# Patient Record
Sex: FEMALE | Race: WHITE | NOT HISPANIC OR LATINO | Employment: OTHER | URBAN - METROPOLITAN AREA
[De-identification: names, ages, dates, MRNs, and addresses within clinical notes are randomized per-mention and may not be internally consistent; named-entity substitution may affect disease eponyms.]

---

## 2017-07-25 ENCOUNTER — OFFICE VISIT - HEALTHEAST (OUTPATIENT)
Dept: FAMILY MEDICINE | Facility: CLINIC | Age: 57
End: 2017-07-25

## 2017-07-25 DIAGNOSIS — L30.9 DERMATITIS: ICD-10-CM

## 2017-07-25 ASSESSMENT — MIFFLIN-ST. JEOR: SCORE: 1095.69

## 2017-08-08 ENCOUNTER — COMMUNICATION - HEALTHEAST (OUTPATIENT)
Dept: FAMILY MEDICINE | Facility: CLINIC | Age: 57
End: 2017-08-08

## 2017-10-12 ENCOUNTER — COMMUNICATION - HEALTHEAST (OUTPATIENT)
Dept: FAMILY MEDICINE | Facility: CLINIC | Age: 57
End: 2017-10-12

## 2017-10-12 DIAGNOSIS — G25.0 ESSENTIAL TREMOR: ICD-10-CM

## 2018-04-01 ENCOUNTER — COMMUNICATION - HEALTHEAST (OUTPATIENT)
Dept: FAMILY MEDICINE | Facility: CLINIC | Age: 58
End: 2018-04-01

## 2018-04-01 DIAGNOSIS — G25.0 ESSENTIAL TREMOR: ICD-10-CM

## 2018-09-19 ENCOUNTER — COMMUNICATION - HEALTHEAST (OUTPATIENT)
Dept: FAMILY MEDICINE | Facility: CLINIC | Age: 58
End: 2018-09-19

## 2018-11-26 ENCOUNTER — COMMUNICATION - HEALTHEAST (OUTPATIENT)
Dept: FAMILY MEDICINE | Facility: CLINIC | Age: 58
End: 2018-11-26

## 2018-11-26 ENCOUNTER — OFFICE VISIT - HEALTHEAST (OUTPATIENT)
Dept: FAMILY MEDICINE | Facility: CLINIC | Age: 58
End: 2018-11-26

## 2018-11-26 DIAGNOSIS — R10.32 ABDOMINAL PAIN, LEFT LOWER QUADRANT: ICD-10-CM

## 2018-11-26 DIAGNOSIS — Z12.31 VISIT FOR SCREENING MAMMOGRAM: ICD-10-CM

## 2018-11-26 DIAGNOSIS — N94.10 DYSPAREUNIA, FEMALE: ICD-10-CM

## 2018-11-26 ASSESSMENT — MIFFLIN-ST. JEOR: SCORE: 1135.6

## 2018-12-13 ENCOUNTER — COMMUNICATION - HEALTHEAST (OUTPATIENT)
Dept: FAMILY MEDICINE | Facility: CLINIC | Age: 58
End: 2018-12-13

## 2018-12-17 ENCOUNTER — COMMUNICATION - HEALTHEAST (OUTPATIENT)
Dept: FAMILY MEDICINE | Facility: CLINIC | Age: 58
End: 2018-12-17

## 2018-12-19 ENCOUNTER — RECORDS - HEALTHEAST (OUTPATIENT)
Dept: ADMINISTRATIVE | Facility: OTHER | Age: 58
End: 2018-12-19

## 2018-12-21 ENCOUNTER — COMMUNICATION - HEALTHEAST (OUTPATIENT)
Dept: FAMILY MEDICINE | Facility: CLINIC | Age: 58
End: 2018-12-21

## 2018-12-21 DIAGNOSIS — R10.32 LLQ ABDOMINAL PAIN: ICD-10-CM

## 2018-12-26 ENCOUNTER — COMMUNICATION - HEALTHEAST (OUTPATIENT)
Dept: FAMILY MEDICINE | Facility: CLINIC | Age: 58
End: 2018-12-26

## 2018-12-27 ENCOUNTER — COMMUNICATION - HEALTHEAST (OUTPATIENT)
Dept: FAMILY MEDICINE | Facility: CLINIC | Age: 58
End: 2018-12-27

## 2019-03-14 ENCOUNTER — OFFICE VISIT - HEALTHEAST (OUTPATIENT)
Dept: FAMILY MEDICINE | Facility: CLINIC | Age: 59
End: 2019-03-14

## 2019-03-14 DIAGNOSIS — G25.0 ESSENTIAL TREMOR: ICD-10-CM

## 2019-03-14 DIAGNOSIS — F32.A DEPRESSION, UNSPECIFIED DEPRESSION TYPE: ICD-10-CM

## 2019-03-14 DIAGNOSIS — Z12.11 SCREEN FOR COLON CANCER: ICD-10-CM

## 2019-03-14 DIAGNOSIS — L30.9 ECZEMA, UNSPECIFIED TYPE: ICD-10-CM

## 2019-03-14 ASSESSMENT — MIFFLIN-ST. JEOR: SCORE: 1108.39

## 2019-06-06 ENCOUNTER — COMMUNICATION - HEALTHEAST (OUTPATIENT)
Dept: FAMILY MEDICINE | Facility: CLINIC | Age: 59
End: 2019-06-06

## 2019-06-14 ENCOUNTER — COMMUNICATION - HEALTHEAST (OUTPATIENT)
Dept: FAMILY MEDICINE | Facility: CLINIC | Age: 59
End: 2019-06-14

## 2019-06-14 DIAGNOSIS — F32.A DEPRESSION, UNSPECIFIED DEPRESSION TYPE: ICD-10-CM

## 2019-06-14 DIAGNOSIS — L30.9 DERMATITIS: ICD-10-CM

## 2019-12-03 ENCOUNTER — COMMUNICATION - HEALTHEAST (OUTPATIENT)
Dept: FAMILY MEDICINE | Facility: CLINIC | Age: 59
End: 2019-12-03

## 2019-12-03 DIAGNOSIS — R68.89 COLD INTOLERANCE: ICD-10-CM

## 2019-12-06 ENCOUNTER — COMMUNICATION - HEALTHEAST (OUTPATIENT)
Dept: SCHEDULING | Facility: CLINIC | Age: 59
End: 2019-12-06

## 2019-12-11 ENCOUNTER — RECORDS - HEALTHEAST (OUTPATIENT)
Dept: ADMINISTRATIVE | Facility: OTHER | Age: 59
End: 2019-12-11

## 2020-06-03 ENCOUNTER — COMMUNICATION - HEALTHEAST (OUTPATIENT)
Dept: FAMILY MEDICINE | Facility: CLINIC | Age: 60
End: 2020-06-03

## 2020-06-03 DIAGNOSIS — G25.0 ESSENTIAL TREMOR: ICD-10-CM

## 2020-06-18 ENCOUNTER — COMMUNICATION - HEALTHEAST (OUTPATIENT)
Dept: FAMILY MEDICINE | Facility: CLINIC | Age: 60
End: 2020-06-18

## 2020-06-23 ENCOUNTER — OFFICE VISIT - HEALTHEAST (OUTPATIENT)
Dept: FAMILY MEDICINE | Facility: CLINIC | Age: 60
End: 2020-06-23

## 2020-06-23 DIAGNOSIS — N95.2 ATROPHIC VAGINITIS: ICD-10-CM

## 2020-06-23 DIAGNOSIS — F41.1 GAD (GENERALIZED ANXIETY DISORDER): ICD-10-CM

## 2020-06-23 DIAGNOSIS — R21 RASH: ICD-10-CM

## 2020-06-23 DIAGNOSIS — G25.0 ESSENTIAL TREMOR: ICD-10-CM

## 2020-06-23 RX ORDER — PROPRANOLOL HYDROCHLORIDE 40 MG/1
40 TABLET ORAL 2 TIMES DAILY
Qty: 180 TABLET | Refills: 3 | Status: SHIPPED | OUTPATIENT
Start: 2020-06-23 | End: 2021-11-01

## 2020-06-23 RX ORDER — FLUOXETINE 10 MG/1
10 CAPSULE ORAL DAILY
Qty: 90 CAPSULE | Refills: 3 | Status: SHIPPED | OUTPATIENT
Start: 2020-06-23 | End: 2021-11-01

## 2020-06-23 RX ORDER — ESTRADIOL 0.1 MG/G
CREAM VAGINAL
Qty: 42.5 G | Refills: 1 | Status: SHIPPED
Start: 2020-06-23 | End: 2021-11-01

## 2020-06-23 RX ORDER — KETOCONAZOLE 20 MG/G
CREAM TOPICAL DAILY
Qty: 15 G | Refills: 0 | Status: SHIPPED | OUTPATIENT
Start: 2020-06-23 | End: 2024-06-24

## 2020-06-23 ASSESSMENT — PATIENT HEALTH QUESTIONNAIRE - PHQ9: SUM OF ALL RESPONSES TO PHQ QUESTIONS 1-9: 1

## 2020-06-23 ASSESSMENT — MIFFLIN-ST. JEOR: SCORE: 1089.68

## 2021-05-03 ENCOUNTER — RECORDS - HEALTHEAST (OUTPATIENT)
Dept: GENERAL RADIOLOGY | Facility: CLINIC | Age: 61
End: 2021-05-03

## 2021-05-03 ENCOUNTER — OFFICE VISIT - HEALTHEAST (OUTPATIENT)
Dept: FAMILY MEDICINE | Facility: CLINIC | Age: 61
End: 2021-05-03

## 2021-05-03 DIAGNOSIS — M25.572 PAIN IN LEFT ANKLE AND JOINTS OF LEFT FOOT: ICD-10-CM

## 2021-05-03 DIAGNOSIS — M25.572 PAIN IN JOINT, ANKLE AND FOOT, LEFT: ICD-10-CM

## 2021-05-03 DIAGNOSIS — S92.352D CLOSED DISPLACED FRACTURE OF FIFTH METATARSAL BONE OF LEFT FOOT WITH ROUTINE HEALING: ICD-10-CM

## 2021-05-03 ASSESSMENT — MIFFLIN-ST. JEOR: SCORE: 1104.65

## 2021-05-03 NOTE — ASSESSMENT & PLAN NOTE
Fifth metatarsal fracture, closed.  Given the displacement, will place and nonweightbearing along with splinting.  Will send to podiatry for further evaluation and treatment.  Continue icing and NSAIDs as needed.

## 2021-05-03 NOTE — ASSESSMENT & PLAN NOTE
Nonweightbearing, splinting, podiatry referral.  Also given relatively low pressure resulting in fracture, will get DEXA scan as I amworried for bone health overall.

## 2021-05-05 ENCOUNTER — COMMUNICATION - HEALTHEAST (OUTPATIENT)
Dept: PODIATRY | Facility: CLINIC | Age: 61
End: 2021-05-05

## 2021-05-05 ENCOUNTER — RECORDS - HEALTHEAST (OUTPATIENT)
Dept: ADMINISTRATIVE | Facility: OTHER | Age: 61
End: 2021-05-05

## 2021-05-05 ENCOUNTER — OFFICE VISIT - HEALTHEAST (OUTPATIENT)
Dept: PODIATRY | Facility: CLINIC | Age: 61
End: 2021-05-05

## 2021-05-05 ENCOUNTER — OFFICE VISIT - HEALTHEAST (OUTPATIENT)
Dept: FAMILY MEDICINE | Facility: CLINIC | Age: 61
End: 2021-05-05

## 2021-05-05 ENCOUNTER — AMBULATORY - HEALTHEAST (OUTPATIENT)
Dept: VASCULAR SURGERY | Facility: CLINIC | Age: 61
End: 2021-05-05

## 2021-05-05 ENCOUNTER — SURGERY - HEALTHEAST (OUTPATIENT)
Dept: VASCULAR SURGERY | Facility: CLINIC | Age: 61
End: 2021-05-05

## 2021-05-05 DIAGNOSIS — Z01.818 PREOPERATIVE EXAMINATION: ICD-10-CM

## 2021-05-05 DIAGNOSIS — S92.352A CLOSED DISPLACED FRACTURE OF FIFTH METATARSAL BONE OF LEFT FOOT, INITIAL ENCOUNTER: ICD-10-CM

## 2021-05-05 DIAGNOSIS — S92.352D CLOSED DISPLACED FRACTURE OF FIFTH METATARSAL BONE OF LEFT FOOT WITH ROUTINE HEALING: ICD-10-CM

## 2021-05-05 LAB
ATRIAL RATE - MUSE: 52 BPM
DIASTOLIC BLOOD PRESSURE - MUSE: NORMAL
INTERPRETATION ECG - MUSE: NORMAL
P AXIS - MUSE: 43 DEGREES
PR INTERVAL - MUSE: 158 MS
QRS DURATION - MUSE: 80 MS
QT - MUSE: 416 MS
QTC - MUSE: 386 MS
R AXIS - MUSE: -25 DEGREES
SYSTOLIC BLOOD PRESSURE - MUSE: NORMAL
T AXIS - MUSE: 41 DEGREES
VENTRICULAR RATE- MUSE: 52 BPM

## 2021-05-05 ASSESSMENT — MIFFLIN-ST. JEOR: SCORE: 1090.24

## 2021-05-06 ENCOUNTER — AMBULATORY - HEALTHEAST (OUTPATIENT)
Dept: VASCULAR SURGERY | Facility: CLINIC | Age: 61
End: 2021-05-06

## 2021-05-06 ENCOUNTER — ANESTHESIA - HEALTHEAST (OUTPATIENT)
Dept: SURGERY | Facility: HOSPITAL | Age: 61
End: 2021-05-06

## 2021-05-06 ENCOUNTER — COMMUNICATION - HEALTHEAST (OUTPATIENT)
Dept: SCHEDULING | Facility: CLINIC | Age: 61
End: 2021-05-06

## 2021-05-06 ENCOUNTER — COMMUNICATION - HEALTHEAST (OUTPATIENT)
Dept: EMERGENCY MEDICINE | Facility: CLINIC | Age: 61
End: 2021-05-06

## 2021-05-06 DIAGNOSIS — S92.352D CLOSED DISPLACED FRACTURE OF FIFTH METATARSAL BONE OF LEFT FOOT WITH ROUTINE HEALING: ICD-10-CM

## 2021-05-06 LAB
SARS-COV-2 PCR COMMENT: ABNORMAL
SARS-COV-2 RNA SPEC QL NAA+PROBE: POSITIVE
SARS-COV-2 VIRUS SPECIMEN SOURCE: ABNORMAL

## 2021-05-07 ENCOUNTER — AMBULATORY - HEALTHEAST (OUTPATIENT)
Dept: NURSING | Facility: CLINIC | Age: 61
End: 2021-05-07

## 2021-05-07 ENCOUNTER — COMMUNICATION - HEALTHEAST (OUTPATIENT)
Dept: NURSING | Facility: CLINIC | Age: 61
End: 2021-05-07

## 2021-05-07 ENCOUNTER — RECORDS - HEALTHEAST (OUTPATIENT)
Dept: ADMINISTRATIVE | Facility: OTHER | Age: 61
End: 2021-05-07

## 2021-05-07 DIAGNOSIS — U07.1 2019 NOVEL CORONAVIRUS DISEASE (COVID-19): ICD-10-CM

## 2021-05-11 ENCOUNTER — COMMUNICATION - HEALTHEAST (OUTPATIENT)
Dept: FAMILY MEDICINE | Facility: CLINIC | Age: 61
End: 2021-05-11

## 2021-05-13 ENCOUNTER — AMBULATORY - HEALTHEAST (OUTPATIENT)
Dept: VASCULAR SURGERY | Facility: CLINIC | Age: 61
End: 2021-05-13

## 2021-05-13 DIAGNOSIS — Z98.890 S/P ORIF (OPEN REDUCTION INTERNAL FIXATION) FRACTURE: ICD-10-CM

## 2021-05-13 DIAGNOSIS — S92.352A CLOSED DISPLACED FRACTURE OF FIFTH METATARSAL BONE OF LEFT FOOT, INITIAL ENCOUNTER: ICD-10-CM

## 2021-05-13 DIAGNOSIS — Z87.81 S/P ORIF (OPEN REDUCTION INTERNAL FIXATION) FRACTURE: ICD-10-CM

## 2021-05-18 ENCOUNTER — RECORDS - HEALTHEAST (OUTPATIENT)
Dept: GENERAL RADIOLOGY | Facility: CLINIC | Age: 61
End: 2021-05-18

## 2021-05-18 ENCOUNTER — OFFICE VISIT - HEALTHEAST (OUTPATIENT)
Dept: PODIATRY | Facility: CLINIC | Age: 61
End: 2021-05-18

## 2021-05-18 DIAGNOSIS — Z98.890 OTHER SPECIFIED POSTPROCEDURAL STATES: ICD-10-CM

## 2021-05-18 DIAGNOSIS — Z87.81 PERSONAL HISTORY OF (HEALED) TRAUMATIC FRACTURE: ICD-10-CM

## 2021-05-18 DIAGNOSIS — S92.352A CLOSED DISPLACED FRACTURE OF FIFTH METATARSAL BONE OF LEFT FOOT, INITIAL ENCOUNTER: ICD-10-CM

## 2021-05-18 ASSESSMENT — MIFFLIN-ST. JEOR: SCORE: 1108.39

## 2021-05-24 ENCOUNTER — RECORDS - HEALTHEAST (OUTPATIENT)
Dept: ADMINISTRATIVE | Facility: CLINIC | Age: 61
End: 2021-05-24

## 2021-05-25 ENCOUNTER — RECORDS - HEALTHEAST (OUTPATIENT)
Dept: ADMINISTRATIVE | Facility: CLINIC | Age: 61
End: 2021-05-25

## 2021-05-26 ENCOUNTER — RECORDS - HEALTHEAST (OUTPATIENT)
Dept: ADMINISTRATIVE | Facility: CLINIC | Age: 61
End: 2021-05-26

## 2021-05-27 ENCOUNTER — RECORDS - HEALTHEAST (OUTPATIENT)
Dept: ADMINISTRATIVE | Facility: CLINIC | Age: 61
End: 2021-05-27

## 2021-05-27 VITALS
OXYGEN SATURATION: 97 % | SYSTOLIC BLOOD PRESSURE: 123 MMHG | BODY MASS INDEX: 20.85 KG/M2 | TEMPERATURE: 98 F | WEIGHT: 122.4 LBS | DIASTOLIC BLOOD PRESSURE: 76 MMHG | HEART RATE: 59 BPM

## 2021-05-27 VITALS
HEART RATE: 69 BPM | WEIGHT: 118 LBS | HEIGHT: 64 IN | SYSTOLIC BLOOD PRESSURE: 110 MMHG | OXYGEN SATURATION: 96 % | DIASTOLIC BLOOD PRESSURE: 78 MMHG | BODY MASS INDEX: 20.14 KG/M2

## 2021-05-27 VITALS
SYSTOLIC BLOOD PRESSURE: 104 MMHG | HEIGHT: 64 IN | HEART RATE: 78 BPM | DIASTOLIC BLOOD PRESSURE: 54 MMHG | BODY MASS INDEX: 20.83 KG/M2 | WEIGHT: 122 LBS | RESPIRATION RATE: 15 BRPM

## 2021-05-27 VITALS
SYSTOLIC BLOOD PRESSURE: 102 MMHG | DIASTOLIC BLOOD PRESSURE: 64 MMHG | BODY MASS INDEX: 20.54 KG/M2 | TEMPERATURE: 97.9 F | WEIGHT: 120.3 LBS | HEART RATE: 60 BPM | HEIGHT: 64 IN | RESPIRATION RATE: 12 BRPM

## 2021-05-27 ASSESSMENT — PATIENT HEALTH QUESTIONNAIRE - PHQ9: SUM OF ALL RESPONSES TO PHQ QUESTIONS 1-9: 1

## 2021-05-28 ENCOUNTER — AMBULATORY - HEALTHEAST (OUTPATIENT)
Dept: VASCULAR SURGERY | Facility: CLINIC | Age: 61
End: 2021-05-28

## 2021-05-28 ENCOUNTER — OFFICE VISIT - HEALTHEAST (OUTPATIENT)
Dept: PODIATRY | Facility: CLINIC | Age: 61
End: 2021-05-28

## 2021-05-28 ENCOUNTER — RECORDS - HEALTHEAST (OUTPATIENT)
Dept: ADMINISTRATIVE | Facility: CLINIC | Age: 61
End: 2021-05-28

## 2021-05-28 DIAGNOSIS — S92.352A CLOSED DISPLACED FRACTURE OF FIFTH METATARSAL BONE OF LEFT FOOT, INITIAL ENCOUNTER: ICD-10-CM

## 2021-05-31 VITALS — HEIGHT: 64 IN | WEIGHT: 119.2 LBS | BODY MASS INDEX: 20.35 KG/M2

## 2021-06-02 VITALS — WEIGHT: 122 LBS | HEIGHT: 64 IN | BODY MASS INDEX: 20.83 KG/M2

## 2021-06-02 VITALS — BODY MASS INDEX: 21.85 KG/M2 | HEIGHT: 64 IN | WEIGHT: 128 LBS

## 2021-06-04 VITALS — BODY MASS INDEX: 19.97 KG/M2 | WEIGHT: 117 LBS | HEIGHT: 64 IN

## 2021-06-04 NOTE — TELEPHONE ENCOUNTER
Patient is calling to state that her doctor would like her to have a thyroid cascade test.  Patient would like to know what that would actually be looking for.  Did discuss with patient that the thyroid is a gland that helps to control many functions in the body such as metabolism.  Patient did state that she mentioned such symptoms to her PCP about fatigue and weight gain, feeling cold among others.  Did discuss with patient that the test will measure the hormone levels in her blood and this will help her provider give her treatment recommendations.  Patient stated understanding and had no further questions.  Farheen Quiroga RN  [facility] Triage Nurse  3:40 PM  12/6/2019

## 2021-06-07 ENCOUNTER — COMMUNICATION - HEALTHEAST (OUTPATIENT)
Dept: PODIATRY | Facility: CLINIC | Age: 61
End: 2021-06-07

## 2021-06-08 NOTE — TELEPHONE ENCOUNTER
RN cannot approve Refill Request    RN can NOT refill this medication PCP messaged that patient is overdue for Labs and Office Visit. Last office visit: 3/14/2019 Dorothy Gimenez MD Last Physical: 12/14/2016 Last MTM visit: Visit date not found Last visit same specialty: 3/14/2019 Dorothy Gimenez MD.  Next visit within 3 mo: Visit date not found  Next physical within 3 mo: Visit date not found      Ronit Weller, [facility] Triage/Med Refill 6/6/2020    Requested Prescriptions   Pending Prescriptions Disp Refills     propranoloL (INDERAL) 40 MG tablet [Pharmacy Med Name: Propranolol HCl Oral Tablet 40 MG] 180 tablet 0     Sig: TAKE ONE TABLET BY MOUTH THREE TIMES DAILY AS NEEDED       Beta-Blockers Refill Protocol Failed - 6/3/2020  9:36 AM        Failed - PCP or prescribing provider visit in past 12 months or next 3 months     Last office visit with prescriber/PCP: 3/14/2019 Dorothy Gimenez MD OR same dept: Visit date not found OR same specialty: 3/14/2019 Dorothy Gimenez MD  Last physical: 12/14/2016 Last MTM visit: Visit date not found   Next visit within 3 mo: Visit date not found  Next physical within 3 mo: Visit date not found  Prescriber OR PCP: Dorothy Gimenez MD  Last diagnosis associated with med order: There are no diagnoses linked to this encounter.  If protocol passes may refill for 12 months if within 3 months of last provider visit (or a total of 15 months).             Failed - Blood pressure filed in past 12 months     BP Readings from Last 1 Encounters:   03/14/19 116/74

## 2021-06-08 NOTE — TELEPHONE ENCOUNTER
Patient is overdue for O.V.; please schedule virtual med check. 30 day supply of Propranolol given

## 2021-06-08 NOTE — TELEPHONE ENCOUNTER
Patient has no health insurance and can't afford an video visit at this time.  [facility]

## 2021-06-09 NOTE — PROGRESS NOTES
"Karly Pemberton is a 59 y.o. female who is being evaluated via a billable telephone visit.      The patient has been notified of following:     \"This telephone visit will be conducted via a call between you and your physician/provider. We have found that certain health care needs can be provided without the need for a physical exam.  This service lets us provide the care you need with a short phone conversation.  If a prescription is necessary we can send it directly to your pharmacy.  If lab work is needed we can place an order for that and you can then stop by our lab to have the test done at a later time.    Telephone visits are billed at different rates depending on your insurance coverage. During this emergency period, for some insurers they may be billed the same as an in-person visit.  Please reach out to your insurance provider with any questions.    If during the course of the call the physician/provider feels a telephone visit is not appropriate, you will not be charged for this service.\"    Patient has given verbal consent to a Telephone visit? Yes    What phone number would you like to be contacted at? [phone]    Patient would like to receive their AVS by AVS Preference: Kylie.    Additional provider notes: Karly is a 59-year-old female presenting today via a virtual telephone visit for a medication check in.  The patient has a history of generalized anxiety disorder for which she is on fluoxetine 30 mg daily.  She has been stable on this dose and doing well for quite some time and is interested in continuing.  She also has a history of atrophic vaginitis for which she takes estradiol.  Lastly, she has an essential tremor and she is on propranolol for that.  She is doing well with all of her medications and in general does not have any specific complaints or concerns today.  Patient does mention she currently is out of health insurance.    Assessment/Plan:  1. JULIETA (generalized anxiety " disorder)  Continue current dosage of medication; doing well  - FLUoxetine (PROZAC) 20 MG capsule; Take 1 capsule (20 mg total) by mouth daily.  Dispense: 90 capsule; Refill: 3  - FLUoxetine (PROZAC) 10 MG capsule; Take 1 capsule (10 mg total) by mouth daily.  Dispense: 90 capsule; Refill: 3    2. Essential tremor  Continue; stable and responding well to Propranolol  - propranoloL (INDERAL) 40 MG tablet; Take 1 tablet (40 mg total) by mouth 2 (two) times a day.  Dispense: 180 tablet; Refill: 3    3. Rash  - ketoconazole (NIZORAL) 2 % cream; Apply topically daily.  Dispense: 15 g; Refill: 0    4. Atrophic vaginitis  - estradioL (ESTRACE) 0.01 % (0.1 mg/gram) vaginal cream; Insert .5-1 GM twice weekly intravaginally  Dispense: 42.5 g; Refill: 1        Phone call duration:  7 minutes    Dorothy Gimenez MD

## 2021-06-09 NOTE — TELEPHONE ENCOUNTER
Called and scheduled patient for a telephone visit med check next Tuesday with . She declined video due to cost.       Angella Young CMA 6/19/2020 2:49 PM   Kang SARAVIA

## 2021-06-12 NOTE — PROGRESS NOTES
Assessment/Plan:     1. Dermatitis  Refilled triamcinolone.  Discussed allergy testing and avoidance.  Patient states she knows what typically causes her flareup so she declines at this time.  Call return to care follow-up with primary care provider if symptoms worsen or do not improve.  - triamcinolone (KENALOG) 0.1 % ointment; Apply topically 3 (three) times a day.  Dispense: 15 g; Refill: 1          Subjective:      Karly Pemberton is a 56 y.o. female comes in today complaining of a rash on the lips.  States it has been there for about a week and a half she has been using Vaseline on it which does seem to calm it down but is wanting to try something stronger.  Has a prescription of steroid creams that she previously tried for it but the tube is MD requesting refill.  States that she has had these off and on over the last couple years.  She thinks it is due to food allergies.  She otherwise does not have any concerns does not feeling swelling inside her mouth any shortness of breath or difficulty swallowing.  She typically goes to another [facility] office my first time meeting with her so briefly reviewed the rest of her family history.    Current Outpatient Prescriptions   Medication Sig Dispense Refill     betamethasone dipropionate (DIPROLENE) 0.05 % cream Apply to affected skin twice daily 45 g 0     FLUoxetine (PROZAC) 20 MG capsule Take 1 capsule (20 mg total) by mouth daily. 90 capsule 3     hydrocortisone 1 % cream Apply 1 application topically 2 (two) times a day.       propranolol (INDERAL) 10 MG tablet TAKE ONE TABLET BY MOUTH THREE TIMES DAILY AS NEEDED 90 tablet 2     triamcinolone (KENALOG) 0.1 % ointment Apply topically 3 (three) times a day. 15 g 1     No current facility-administered medications for this visit.      Allergies   Allergen Reactions     Compazine [Prochlorperazine Edisylate] Other (See Comments)     Per patient frozen face      Past Medical History, Family History, and Social  "History reviewed.  Past Medical History:   Diagnosis Date     Fibrocystic breast      Past Surgical History:   Procedure Laterality Date     MI LIGATE FALLOPIAN TUBE      Description: Tubal Ligation;  Recorded: 09/04/2014;     Compazine [prochlorperazine edisylate]  Family History   Problem Relation Age of Onset     Breast cancer Mother 65     Social History     Social History     Marital status:      Spouse name: N/A     Number of children: N/A     Years of education: N/A     Occupational History     Not on file.     Social History Main Topics     Smoking status: Never Smoker     Smokeless tobacco: Never Used     Alcohol use No     Drug use: No     Sexual activity: Yes     Partners: Male     Other Topics Concern     Not on file     Social History Narrative         Review of systems is as stated in HPI, and the remainder of the 10 system review is otherwise negative.    Objective:     Vitals:    07/25/17 1453   BP: 110/60   Patient Site: Right Arm   Patient Position: Sitting   Cuff Size: Adult Regular   Pulse: 72   Weight: 119 lb 3.2 oz (54.1 kg)   Height: 5' 4\" (1.626 m)    Body mass index is 20.46 kg/(m^2).  Wt Readings from Last 3 Encounters:   07/25/17 119 lb 3.2 oz (54.1 kg)   12/14/16 114 lb 8 oz (51.9 kg)   09/21/15 115 lb 8 oz (52.4 kg)       General Appearance:    Alert, cooperative, no distress, appears stated age    Head:    Normocephalic, without obvious abnormality, atraumatic   Eyes:    PERRL, , no conjunctivitis    Ears:    Normal  external ear canals   Nose:   Mucosa normal, no drainage        Throat:   Oropharynx is clear   Neck:   Supple, symmetrical, no adenopathy, no thyromegally, no carotid bruit        Lungs:     Clear to auscultation bilaterally, respirations unlabored        Heart:    Regular rate and rhythm, S1 and S2 normal, no murmur, rub    or gallop                   Extremities:   Extremities normal, atraumatic, no cyanosis or edema       Neuro:   cranial nerves grossly intact  "       Skin:  Mild erythema with micropapules.  No vesicles open lesions discharge or signs of infection around lips, otherwise no rashes or lesions   :          This note has been dictated using voice recognition software. Any grammatical or context distortions are unintentional and inherent to the software.

## 2021-06-16 PROBLEM — S92.352A CLOSED DISPLACED FRACTURE OF FIFTH METATARSAL BONE OF LEFT FOOT, INITIAL ENCOUNTER: Status: ACTIVE | Noted: 2021-05-05

## 2021-06-16 PROBLEM — N95.2 ATROPHIC VAGINITIS: Status: ACTIVE | Noted: 2020-06-23

## 2021-06-16 PROBLEM — F41.1 GAD (GENERALIZED ANXIETY DISORDER): Status: ACTIVE | Noted: 2019-03-14

## 2021-06-16 PROBLEM — S92.352D CLOSED DISPLACED FRACTURE OF FIFTH METATARSAL BONE OF LEFT FOOT WITH ROUTINE HEALING: Status: ACTIVE | Noted: 2021-05-03

## 2021-06-16 PROBLEM — M25.572 PAIN IN JOINT, ANKLE AND FOOT, LEFT: Status: ACTIVE | Noted: 2021-05-03

## 2021-06-17 NOTE — PATIENT INSTRUCTIONS - HE
Stop in [address] 20-25 minutes prior to your next appointment to get repeat x-rays.    Remain non-weightbearing on your left foot with CAM boot on.    Gauze applied today, please leave in place until follow up appointment in 2 weeks.    It is not ok to get your wound wet     Call the clinic for any questions regarding your wound or cares and if you experience signs or symptoms of infection including: fevers, chills, increased redness, increased drainage, foul odor, increased pain at [phone].

## 2021-06-17 NOTE — ANESTHESIA PROCEDURE NOTES
Peripheral Block    Patient location during procedure: pre-op  Start time: 5/6/2021 4:38 PM  End time: 5/6/2021 4:41 PM  post-op analgesia per surgeon order as noted in medical record  Staffing:  Performing  Anesthesiologist: Traci Mai MD  Preanesthetic Checklist  Completed: patient identified, site marked, risks, benefits, and alternatives discussed, timeout performed, consent obtained, airway assessed, oxygen available, suction available, emergency drugs available and hand hygiene performed  Peripheral Block  Block type: sciatic, popliteal  Prep: ChloraPrep  Patient position: supine  Patient monitoring: blood pressure, heart rate, cardiac monitor and continuous pulse oximetry  Laterality: left  Injection technique: ultrasound guided    Ultrasound used to visualize needle placement in proximity to nerve being blocked: yes   US used to visualize anesthetic spread  Visualized anatomic structures normal  No Pathological Findings  Permanent ultrasound image captured for medical record  Sterile gel and probe cover used for ultrasound.  Needle  Needle type: Stimuplex   Needle gauge: 20G  Needle length: 4 in  no peripheral nerve catheter placed  Assessment  Injection assessment: no difficulty with injection

## 2021-06-17 NOTE — TELEPHONE ENCOUNTER
Reason for Call: Jenni is calling to get order for DEXA scan changed to CT Bone density. Verbal order would be fine.    Detailed comments: please call Jenni with verbal order for CT Bone Density. She is unable to schedule without the correct order.    Phone Number Patient can be reached at: Other phone number:  Jenni [phone]    Best Time: any    Can we leave a detailed message on this number?: Yes    Call taken on 5/11/2021 at 12:53 PM by Dayana Gillespie

## 2021-06-17 NOTE — TELEPHONE ENCOUNTER
She tested positive for COVID 04/19/21 with [facility]. She will email the results. She was swabbed again today but it will most likely come back positive. Please confirm if past results will satisfy surgery center so she can proceed with surgery tomorrow. I will forward the email when it arrives.   WAPD at bedside.

## 2021-06-17 NOTE — TELEPHONE ENCOUNTER
I would prefer if she should get her Dexa scan done at [facility]; is that something she would consider?

## 2021-06-17 NOTE — PATIENT INSTRUCTIONS - HE
Surgery Information    **ALL [facility] PAPERWORK IS TO BE FAXED -[phone], IT WILL BE PROCESSED AFTER SURGERY IS COMPLETE.  You MUST have a Preoperative physical within 30 days before surgery!!!      Surgery Date 5/5    Surgery Time: 4:15 at [facility]- Arrival time 2:15    The Preop Nurse will call you 1-2 days before with exact arrival time  ( Arrive at the hospital two hours prior to your surgery and 1 hour prior at the surgery center. Check in at the . The only exception is if you are scheduled for surgery at 7am, you should arrive at 5:30am)    IF YOU NEED TO RESCHEDULE OR CANCEL YOUR SURGERY FOR ANY REASON PLEASE CALL THE CLINIC AS SOON AS POSSIBLE -[phone].    Admission Type: Outpatient    Surgeon: Dr. Cortes    Surgery Procedure: ORIF  fifth metatarsal bone of left foot    Surgery Location: [facility],[address] [place] [address], Main Admitting      COVID TEST: Today    POST OPERATIVE APPT: 5/18 at 10 am    If you take Blood Thinners Such as:  Warfarin, Xarelto, Plavix, Aspirin or Eliquis this will need to be HELD prior to procedure according to primary care provider/doctor or cardiology who prescribes this medication. Generally this is 5 days.    Additional Information: If you use a CPAP machine for sleep apnea please bring it with you for surgery. We will want to monitor your breathing using your normal equipment.   If you need to reach the surgery scheduler please call the main number at [phone] and ask for Joana for Dr. Cortes    [facility] INFORMATION    We need to know a lot of information about you before surgery.     1-5 Days Before:     A nurse will call you for a pre-screening interview. The phone call with the nurse will be much faster and easier if you  Have organized your information prior to the call.     Please have the following information available:    Preoperative Exam completed and faxed to our office    Primary care provider's and any specialty providers'  contact information available. .    If requested by your primary care provider, have any heart and lung exams at least 3 days before surgery.    Have a complete and accurate list of medications available.     Have a list of your allergies/sensitivities and reactions    Know your health history, surgical history, and medical problems    Know any anesthesia issues with you or within your family.     BE SURE TO NOTIFY US IF YOU ARE TAKING ANY BLOOD THINNING AGENTS: Coumadin, Plavix, Aspirin, Xarelto, Eliquis    Someone planned to bring you and stay with you after the surgery    Day Before Surgery    1. STOP Smoking and Drinking: It is important to stop smoking and drinking at least 24 hours before surgery. Smoking and drinking may cause complications in your recovery from anesthesia and may lengthen the healing process.    2. Pack for your hospital stay: If it will be required for you to stay at the hospital after surgery, bring personal items such as a robe, slippers, pajamas, additional clothing and toiletries. Don't forget:    List of medication    Eyeglass case or contact case with solution. You cannot wear contacts during surgery    Copies of your physical exam , lab results and EKG    Copy of Health Care Directives, Living Will or Power of     Insurance Cards    Photo ID    CPAP machine    3. NOTHING BY MOUTH 8 HOURS BEFORE. This includes gum, hard candy, water and mints. The only exception is if you have been instructed by your doctor to take your medications with sips of water. You may rinse your mouth or brush your teeth, but do not swallow water.     4. Remove Nail Polish  Day of Surgery    1. Medications- Take as indicated with sips of water     2. Wear comfortable loose fitting clothes. Wear your glasses-Not contacts. Do not wear jewelry and remove body piercing's. Surgery may be cancelled if they are not removed.     3. If same day surgery-Have a someone come with you to surgery that can help you  understand the surgeon's instructions, drive you home and stay with you overnight the first night.     4. A nurse will call you at home within 72 hours following surgery to see how you are doing. Our nurses and doctors will discuss recovery with you.      The surgery scheduler Joana for Dr. Cortes will contact you to schedule if you were not scheduled today or you need to make any adjustments to your surgery.     You will need a preop physical within 30 days before surgery with your primary care provider. Please note if you do not get a complete History and Physical your surgery will be cancelled.   Please contact your primary to schedule.     A nurse should contact you from the hospital 1-2 days before surgery to review with you.    If you would like a Good Lori Estimate for your upcoming service/procedure contact Cost of Care Estimates at [phone], advocates are available Monday through Friday 8am - 5pm. You may also submit a request online at [email or web address]    [facility]  [address]  [address]     [phone] for facility charge    Anesthesiology charge  [phone]          Please don't hesitate to call us with any additional question or problems  Our number is [phone]     Instructions for Patients Scheduled for Vascular or Podiatry Procedures During the COVID 19 Pandemic    Your Provider has determined that your condition warrants going ahead with your procedure at this time.  You will need to be tested for COVID19 within 72 hours of your procedure. We highly encourage patients to get tested for COVID-19 at one of our designated [facility] testing sites. We process the tests in our lab, which allows us to get the results quickly. If you choose to get tested at a non-[facility] location, you will need to contact your primary care provider to make those arrangements and ensure the results  are available to your surgeon before you are arriving for your procedure. If we do not receive the results in time, your procedure may be postponed or canceled.  Please make sure your test is collected 3-days prior to your procedure date.  The results will need to get faxed to [phone].  After testing, you will need to remain in self-quarantine until your procedure.  If you are notified of a positive COVID-19 result; you will need to call your provider for further recommendations.    Please call [phone] and press option 2 to speak to a nurse.  If the test is positive; DO NOT PRESENT FOR YOUR PROCEDURE until you have been given further instructions.  You will not be called with negative results so arrive as instructed unless otherwise notified.  Don't:    Don't invite visitors or friends into your home.    Don't leave your home unless absolutely necessary.    Don't share utensils and other household items with others in the home.  Do:    Wash your hands regularly with soap and water and use hand  with at least 60% alcohol if you don't have easy access to soap and water.     Disinfect surface areas daily, including doorknobs, electronics - especially phones, laptops and other devices.     Wash utensils and other items thoroughly.     Separate yourself from others in the household as best you can, including pets.    Keep your hands away from your face.     Practice all other prevention tips the CDC recommends, including covering your coughs and sneezes with a tissue or your sleeve and immediately throwing the tissue into the trash and washing your hands.    Call your hospital if you develop the following signs before your surgery:    Fever.    Cough.    Shortness of breath.    Sore throat.    Runny or stuffy nose.    Muscle or body aches.    Headaches.    Fatigue.    Vomiting and diarrhea.    These steps will help keep you & your family, other patients, and hospital staff safe from COVID19.

## 2021-06-17 NOTE — ANESTHESIA PROCEDURE NOTES
Peripheral Block    Patient location during procedure: pre-op  Start time: 5/6/2021 4:36 PM  End time: 5/6/2021 4:38 PM  post-op analgesia per surgeon order as noted in medical record  Staffing:  Performing  Anesthesiologist: Traci Mai MD  Preanesthetic Checklist  Completed: patient identified, site marked, risks, benefits, and alternatives discussed, timeout performed, consent obtained, airway assessed, oxygen available, suction available, emergency drugs available and hand hygiene performed  Peripheral Block  Block type: saphenous, adductor canal block  Prep: ChloraPrep  Patient position: supine  Patient monitoring: blood pressure, heart rate, continuous pulse oximetry and cardiac monitor  Laterality: left  Injection technique: ultrasound guided    Ultrasound used to visualize needle placement in proximity to nerve being blocked: yes   US used to visualize anesthetic spread  Visualized anatomic structures normal  No Pathological Findings  Permanent ultrasound image captured for medical record  Sterile gel and probe cover used for ultrasound.  Needle  Needle type: Stimuplex   Needle gauge: 20G  Needle length: 4 in  no peripheral nerve catheter placed  Assessment  Injection assessment: no difficulty with injection

## 2021-06-17 NOTE — ANESTHESIA CARE TRANSFER NOTE
Last vitals:   Vitals:    05/06/21 1925   BP: 132/70   Pulse: 92   Resp: 14   Temp: 36.1  C (97  F)   SpO2: 98%     Patient's level of consciousness is awake  Spontaneous respirations: yes  Maintains airway independently: yes  Dentition unchanged: yes  Oropharynx: oropharynx clear of all foreign objects    QCDR Measures:  ASA# 20 - Surgical Safety Checklist: WHO surgical safety checklist completed prior to induction    PQRS# 430 - Adult PONV Prevention: NA - Not adult patient, not GA or 3 or more risk factors NOT present  ASA# 8 - Peds PONV Prevention: NA - Not pediatric patient, not GA or 2 or more risk factors NOT present  PQRS# 424 - Nancy-op Temp Management: 4559F - At least one body temp DOCUMENTED => 35.5C or 95.9F within required timeframe  PQRS# 426 - PACU Transfer Protocol: - Transfer of care checklist used  ASA# 14 - Acute Post-op Pain: ASA14B - Patient did NOT experience pain >= 7 out of 10

## 2021-06-17 NOTE — PROGRESS NOTES
[facility]  [address] [address]  Dept: [phone]  Dept Fax: [phone]  Primary Provider: Dorothy Gimenez MD  Pre-op Performing Provider: BANG NELSON      PREOPERATIVE EVALUATION:  Today's date: 5/5/2021    Karly Pemberton is a 60 y.o. female who presents for a preoperative evaluation.    Surgical Information:  Surgery/Procedure: Left Foot  Surgery Location: [place]  Surgeon: Se. Cardenas  Surgery Date: 5/6/2021  Time of Surgery: 4:15pm  Where patient plans to recover: At home with family  Fax number for surgical facility: Note does not need to be faxed, will be available electronically in Epic.    Type of Anesthesia Anticipated: Local with MAC    Assessment & Plan      The proposed surgical procedure is considered LOW risk.    Preoperative examination  No contraindications for planned procedure. She needs to have a COVID test prior to her procedure so we will help her facilitate that today. She apparently had COVID three weeks ago and so there is a real chance that her test result could come back positive. I told her to produce some evidence of her positive test from [facility] and that it would be her surgeon's discretion on how to proceed if her COVID test does indeed come back positive  - Electrocardiogram Perform and Read    Closed displaced fracture of fifth metatarsal bone of left foot with routine healing  She fell awkwardly while doing some yardwork and fractured her fifth metatarsal. She is scheduled for surgery tomorrow.          Risks and Recommendations:  The patient has the following additional risks and recommendations for perioperative complications:   - No identified additional risk factors other than previously addressed    Medication Instructions:  Patient is to take all scheduled medications on the day of surgery    RECOMMENDATION:  APPROVAL GIVEN to proceed with proposed procedure, without further diagnostic evaluation.    Review of external  notes as documented above       Subjective     HPI related to upcoming procedure: She fell awkwardly while doing some yardwork and fractured her fifth metatarsal. She is scheduled for surgery tomorrow.     Preop Questions 5/5/2021   Have you ever had a heart attack or stroke? No   Have you ever had surgery on your heart or blood vessels, such as a stent placement, a coronary artery bypass, or surgery on an artery in your head, neck, heart, or legs? No   Do you have chest pain with activity? No   Do you have a history of  heart failure? No   Do you currently have a cold, bronchitis or symptoms of other infection? No   Do you have a cough, shortness of breath, or wheezing? No   Do you or anyone in your family have previous history of blood clots? No   Do you or does anyone in your family have a serious bleeding problem such as prolonged bleeding following surgeries or cuts? No   Have you ever had problems with anemia or been told to take iron pills? No   Have you had any abnormal blood loss such as black, tarry or bloody stools, or abnormal vaginal bleeding? No   Have you ever had a blood transfusion? No   Are you willing to have a blood transfusion if it is medically needed before, during, or after your surgery? Yes   Have you or any of your relatives ever had problems with anesthesia? YES - Her mother has nausea   Do you have sleep apnea, excessive snoring or daytime drowsiness? No   Do you have any artifical heart valves or other implanted medical devices like a pacemaker, defibrillator, or continuous glucose monitor? No   Do you have artificial joints? No   Are you allergic to latex? No   Is there any chance that you may be pregnant? No     Health Care Directive:  Patient does not have a Health Care Directive or Living Will: Discussed advance care planning with patient; however, patient declined at this time.    Preoperative Review of :    reviewed - no record of controlled substances prescribed.    See  problem list for active medical problems.  Problems all longstanding and stable, except as noted/documented.  See ROS for pertinent symptoms related to these conditions.      Review of Systems  CONSTITUTIONAL: NEGATIVE for fever, chills, change in weight  ENT/MOUTH: Negative for ear, mouth, and throat problems  RESP: NEGATIVE for significant cough or SOB  CV: NEGATIVE for chest pain, palpitations or peripheral edema    Patient Active Problem List    Diagnosis Date Noted     Closed displaced fracture of fifth metatarsal bone of left foot, initial encounter 05/05/2021     Pain in joint, ankle and foot, left 05/03/2021     Closed displaced fracture of fifth metatarsal bone of left foot with routine healing 05/03/2021     Atrophic vaginitis 06/23/2020     JULIETA (generalized anxiety disorder) 03/14/2019     Essential tremor      Past Medical History:   Diagnosis Date     Fibrocystic breast      Past Surgical History:   Procedure Laterality Date     ID LIGATE FALLOPIAN TUBE      Description: Tubal Ligation;  Recorded: 09/04/2014;     Current Outpatient Medications   Medication Sig Dispense Refill     estradioL (ESTRACE) 0.01 % (0.1 mg/gram) vaginal cream Insert .5-1 GM twice weekly intravaginally 42.5 g 1     FLUoxetine (PROZAC) 10 MG capsule Take 1 capsule (10 mg total) by mouth daily. 90 capsule 3     FLUoxetine (PROZAC) 20 MG capsule Take 1 capsule (20 mg total) by mouth daily. 90 capsule 3     propranoloL (INDERAL) 40 MG tablet Take 1 tablet (40 mg total) by mouth 2 (two) times a day. 180 tablet 3     ketoconazole (NIZORAL) 2 % cream Apply topically daily. 15 g 0     No current facility-administered medications for this visit.        Allergies   Allergen Reactions     Compazine [Prochlorperazine Edisylate] Other (See Comments)     Per patient frozen face        Social History     Tobacco Use     Smoking status: Never Smoker     Smokeless tobacco: Never Used   Substance Use Topics     Alcohol use: No      Family History    Problem Relation Age of Onset     Breast cancer Mother 65     Social History     Substance and Sexual Activity   Drug Use No        Objective     /76   Pulse (!) 59   Temp 98  F (36.7  C) (Oral)   Wt 122 lb 6.4 oz (55.5 kg)   LMP  (LMP Unknown)   SpO2 97%   BMI 21.01 kg/m    Physical Exam  GENERAL APPEARANCE: healthy, alert and no distress  HENT: ear canals and TM's normal and nose and mouth without ulcers or lesions  RESP: lungs clear to auscultation - no rales, rhonchi or wheezes  CV: regular rate and rhythm, normal S1 S2, no S3 or S4 and no murmur, click or rub  ABDOMEN: soft, nontender, no HSM or masses and bowel sounds normal  NEURO: Normal strength and tone, sensory exam grossly normal, mentation intact and speech normal    No results for input(s): HGB, PLT, INR, NA, K, CREATININE, HBA1C in the last 73519 hours.     PRE-OP Diagnostics:   No labs were ordered during this visit.  EKG: sinus bradycardia    REVISED CARDIAC RISK INDEX (RCRI)   The patient has the following serious cardiovascular risks for perioperative complications:   - No serious cardiac risks = 0 points    RCRI INTERPRETATION: 0 points: Class I (very low risk - 0.4% complication rate)         Signed Electronically by: David Murray CNP    Copy of this evaluation report is provided to requesting physician.

## 2021-06-17 NOTE — ANESTHESIA POSTPROCEDURE EVALUATION
Patient: Karly Pemberton  Procedure(s):  OPEN REDUCTION INTERNAL FIXATION, fifth metatarsal left foot (Left)  Anesthesia type: MAC    Patient location: Phase II Recovery  Last vitals:   Vitals Value Taken Time   /68 05/06/21 1930   Temp 36.1  C (97  F) 05/06/21 1925   Pulse 91 05/06/21 1938   Resp 14 05/06/21 1930   SpO2 96 % 05/06/21 1938   Vitals shown include unvalidated device data.  Post vital signs: stable  Level of consciousness: awake and responds to simple questions  Post-anesthesia pain: pain controlled  Post-anesthesia nausea and vomiting: no  Pulmonary: unassisted  Cardiovascular: stable  Hydration: adequate  Anesthetic events: no    QCDR Measures:  ASA# 11 - Nancy-op Cardiac Arrest: ASA11B - Patient did NOT experience unanticipated cardiac arrest  ASA# 12 - Nancy-op Mortality Rate: ASA12B - Patient did NOT die  ASA# 13 - PACU Re-Intubation Rate: NA - No ETT / LMA used for case  ASA# 10 - Composite Anes Safety: ASA10A - No serious adverse event    Additional Notes:

## 2021-06-17 NOTE — PROGRESS NOTES
Assessment & Plan   Problem List Items Addressed This Visit     Pain in joint, ankle and foot, left     Fifth metatarsal fracture, closed.  Given the displacement, will place and nonweightbearing along with splinting.  Will send to podiatry for further evaluation and treatment.  Continue icing and NSAIDs as needed.         Relevant Orders    XR Foot Left 3 or More VWS    XR Ankle Left 3 or More VWS    Closed displaced fracture of fifth metatarsal bone of left foot with routine healing - Primary     Nonweightbearing, splinting, podiatry referral.  Also given relatively low pressure resulting in fracture, will get DEXA scan as I am worried for bone health overall.         Relevant Orders    Crutches Standard    Ankle/Foot DME: Walking Boot; Left; Pneumatic    Ambulatory referral to Podiatry - [facility] (includes [facility] groups)    DXA Bone Density Scan         X-ray taken in clinic initially reviewed by me.  Ankle x-ray negative for acute fracture, foot x-ray does show spiral fracture comminuted with displacement of the fifth metatarsal shaft    Return in about 4 months (around 9/5/2021) for Annual physical.  Subjective   Karly Pemberton is 60 y.o. and presents to clinic today for the following health issues   Left foot and ankle pain for the past 2 days.  Started while applying mulch.  She was up on a 1 foot elevation and stumbled backwards felt her ankle twisted she went down and landed on her right hip.  She felt something snap or crackle when she twisted her ankle.  Since then she has had pain with walking.  Initially could not walk on her foot for about 10 minutes.  Then was able to hobble.  Finds if she puts weight on the heel she has no pain.  Points to the main portion of pain being in the fifth metatarsal midshaft.  She is noticed bruising and some swelling around the ankle and the lateral portion of the foot.  No fevers or chills.  Does not recall stepping off the foot left but more twisting  "off of it.  No complaints of right hip pain.  Did not hit her head.    Foot Pain  Pertinent negatives include no chills, coughing, fever, headaches, nausea, numbness, rash or vomiting. The symptoms are aggravated by walking. She has tried acetaminophen, NSAIDs and ice for the symptoms. The treatment provided mild relief.      Review of Systems   Constitutional: Negative for chills and fever.   Respiratory: Negative for cough.    Gastrointestinal: Negative for nausea and vomiting.   Skin: Negative for rash.   Neurological: Negative for numbness and headaches.         Objective    /64 (Patient Site: Left Arm, Patient Position: Sitting, Cuff Size: Adult Regular)   Pulse 60   Temp 97.9  F (36.6  C) (Oral)   Resp 12   Ht 5' 4.25\" (1.632 m)   Wt 120 lb 4.8 oz (54.6 kg)   LMP  (LMP Unknown)   Breastfeeding No   BMI 20.49 kg/m    Body mass index is 20.49 kg/m .  Physical Exam   Constitutional: She is oriented to person, place, and time. She appears well-developed and well-nourished.   HENT:   Head: Normocephalic and atraumatic.   Eyes: Conjunctivae and EOM are normal.   Pulmonary/Chest: Effort normal.   Musculoskeletal:      Comments: Left ankle with edema noticed over the lateral malleoli and lateral portion of foot.  Tender palpation midshaft of fifth metatarsal.  No tenderness palpation of the posterior malleoli bilateral.  Tenderness around cuboid.  Neurovascularly intact distally with capillary refill less than 2 seconds.   Neurological: She is alert and oriented to person, place, and time.   Reflex Scores:       Patellar reflexes are 2+ on the right side and 2+ on the left side.  Psychiatric: She has a normal mood and affect. Her behavior is normal.   Nursing note and vitals reviewed.    "

## 2021-06-17 NOTE — PROGRESS NOTES
FOOT AND ANKLE SURGERY/PODIATRY CONSULT NOTE         ASSESSMENT:   Displaced spiral oblique fracture fifth metatarsal left foot      TREATMENT:  I have recommended ORIF of the fracture fifth metatarsal left foot.  I informed the patient that my partner Dr. Raffaele Cortes will be performing the procedure because I will be unavailable and I recommended having this done as soon as possible.  The patient was agreeable to having Dr. Cortes performed the procedure.  She was told the procedure will be done on an outpatient basis under local anesthesia with IV sedation.  She was told the procedure would take approximately 30 minutes to perform.  She would then be discharged in a cam boot for 6 weeks.  The patient was asked to go n.p.o. at midnight prior to the procedure.  She was asked to see her primary care physician for preoperative consultation.  All pertinent questions were answered.  The patient felt well-informed and was comfortable moving forward with the surgical plan.        HPI: I was asked to see Karly Pemberton today to evaluate and treat a fracture of the fifth metatarsal left foot.  The patient stated that proximally 3 days ago she fell and rolled her foot.  She had a subsequent x-ray taken which showed that she suffered a spiral oblique fracture of the fifth metatarsal.  She had some swelling and some discoloration.  She is currently ambulating with crutches and a Cam boot.  The patient was seen in consultation at the request of Honey James DO for evaluation and treatment of fracture fifth metatarsal left foot.     Past Medical History:   Diagnosis Date     Fibrocystic breast        Past Surgical History:   Procedure Laterality Date     NM LIGATE FALLOPIAN TUBE      Description: Tubal Ligation;  Recorded: 09/04/2014;       Allergies   Allergen Reactions     Compazine [Prochlorperazine Edisylate] Other (See Comments)     Per patient frozen face          Current Outpatient Medications:      estradioL  (ESTRACE) 0.01 % (0.1 mg/gram) vaginal cream, Insert .5-1 GM twice weekly intravaginally, Disp: 42.5 g, Rfl: 1     FLUoxetine (PROZAC) 10 MG capsule, Take 1 capsule (10 mg total) by mouth daily., Disp: 90 capsule, Rfl: 3     FLUoxetine (PROZAC) 20 MG capsule, Take 1 capsule (20 mg total) by mouth daily., Disp: 90 capsule, Rfl: 3     ketoconazole (NIZORAL) 2 % cream, Apply topically daily., Disp: 15 g, Rfl: 0     propranoloL (INDERAL) 40 MG tablet, Take 1 tablet (40 mg total) by mouth 2 (two) times a day., Disp: 180 tablet, Rfl: 3    Family History   Problem Relation Age of Onset     Breast cancer Mother 65       Social History     Socioeconomic History     Marital status:      Spouse name: Not on file     Number of children: Not on file     Years of education: Not on file     Highest education level: Not on file   Occupational History     Not on file   Social Needs     Financial resource strain: Not on file     Food insecurity     Worry: Not on file     Inability: Not on file     Transportation needs     Medical: Not on file     Non-medical: Not on file   Tobacco Use     Smoking status: Never Smoker     Smokeless tobacco: Never Used   Substance and Sexual Activity     Alcohol use: No     Drug use: No     Sexual activity: Yes     Partners: Male   Lifestyle     Physical activity     Days per week: Not on file     Minutes per session: Not on file     Stress: Not on file   Relationships     Social connections     Talks on phone: Not on file     Gets together: Not on file     Attends Restorationist service: Not on file     Active member of club or organization: Not on file     Attends meetings of clubs or organizations: Not on file     Relationship status: Not on file     Intimate partner violence     Fear of current or ex partner: Not on file     Emotionally abused: Not on file     Physically abused: Not on file     Forced sexual activity: Not on file   Other Topics Concern     Not on file   Social History Narrative      Not on file       Review of Systems - Patient denies fever, chills, rash, wound, stiffness, limping, numbness, weakness, heart burn, blood in stool, chest pain with activity, calf pain when walking, shortness of breath with activity, chronic cough, easy bleeding/bruising, swelling of ankles, excessive thirst, fatigue, depression, anxiety.  Patient admits to fracture fifth metatarsal left foot.      OBJECTIVE:  Appearance: alert, well appearing, and in no distress.    Vitals:    05/05/21 0959   BP: 110/78   Pulse: 69   SpO2: 96%       BMI= Body mass index is 20.25 kg/m .    General appearance: Patient is alert and fully cooperative with history & exam.  No sign of distress is noted during the visit.  Psychiatric: Affect is pleasant & appropriate.  Patient appears motivated to improve health.  Respiratory: Breathing is regular & unlabored while sitting.  HEENT: Hearing is intact to spoken word.  Speech is clear.  No gross evidence of visual impairment that would impact ambulation.    Vascular: Dorsalis pedis and posterior tibial pulses are palpable. There is pedal hair growth bilaterally.  CFT < 3 sec from anterior tibial surface to distal digits bilaterally. There is no appreciable edema noted.  Dermatologic: Turgor and texture are within normal limits. No coloration or temperature changes. No primary or secondary lesions noted.  Neurologic: All epicritic and proprioceptive sensations are grossly intact bilaterally.  Musculoskeletal: All active and passive ankle, subtalar, midtarsal, and 1st MPJ range of motion are grossly intact without pain or crepitus.. Manual muscle strength is within normal limits. All dorsiflexors, plantarflexors, invertors, evertors are intact bilaterally. Tenderness present to left foot on palpation. Tenderness to left foot with range of motion. Calf is soft/non-tender without warmth/induration    Imaging:         Xr Ankle Left 3 Or More Vws    Result Date: 5/3/2021  EXAM: XR ANKLE LEFT 3  OR MORE VWS LOCATION: [facility] DATE/TIME: 5/3/2021 1:41 PM INDICATION: Left ankle pain COMPARISON: None.     Normal joint spaces and alignment. No fracture. Mild soft tissue swelling about the ankle, in particular the lateral malleolus consistent with soft tissue injury/sprain.    Xr Foot Left 3 Or More Vws    Result Date: 5/3/2021  EXAM: XR FOOT LEFT 3 OR MORE VWS LOCATION: [facility] DATE/TIME: 5/3/2021 1:41 PM INDICATION: Left foot pain COMPARISON: None.     Acute oblique fracture of the distal diaphysis of the fifth metatarsal. No other fractures identified. Normal alignment. Normal bone mineralization.         David Lang; FRANCOIS  [facility] Foot & Ankle Surgery/Podiatry

## 2021-06-17 NOTE — PROGRESS NOTES
Podiatry Progress Note        ASSESSMENT: S/P ORIF 5th metatarsal left foot       TREATMENT:  -Surgical site on the left foot is progressing well. I reviewed x-rays which indicate reduced fracture with intact plate and screw fixation.     -Gauze dressing applied today which she will keep intact. Continue non-weight bearing on the she foot.     -She will follow-up with me in 2 weeks for suture removal.     Raffaele Cortes DPM  [facility] Podiatry/Foot & Ankle Surgery      HPI: Karly Pemberton was seen again today s/p ORIF left 5th metatarsal. She is doing well and has remained non-weight bearing.    Past Medical History:   Diagnosis Date     Fibrocystic breast      Hereditary essential tremor     left upper extremity       Allergies   Allergen Reactions     Compazine [Prochlorperazine Edisylate] Other (See Comments)     Per patient frozen face          Current Outpatient Medications:      estradioL (ESTRACE) 0.01 % (0.1 mg/gram) vaginal cream, Insert .5-1 GM twice weekly intravaginally, Disp: 42.5 g, Rfl: 1     FLUoxetine (PROZAC) 10 MG capsule, Take 1 capsule (10 mg total) by mouth daily., Disp: 90 capsule, Rfl: 3     FLUoxetine (PROZAC) 20 MG capsule, Take 1 capsule (20 mg total) by mouth daily., Disp: 90 capsule, Rfl: 3     ketoconazole (NIZORAL) 2 % cream, Apply topically daily., Disp: 15 g, Rfl: 0     oxyCODONE (ROXICODONE) 5 MG immediate release tablet, Take 1 tablet (5 mg total) by mouth every 6 (six) hours as needed for pain., Disp: 30 tablet, Rfl: 0     propranoloL (INDERAL) 40 MG tablet, Take 1 tablet (40 mg total) by mouth 2 (two) times a day., Disp: 180 tablet, Rfl: 3    Review of Systems - negative       OBJECTIVE:  Appearance: NAD    Vitals:    05/18/21 1008   BP: 104/54   Pulse: 78   Resp: 15       Surgical site on the left foot has intact sutures with skin edges well approximated, no gapping noted. No erythema left foot. Neurovascular status unchanged left foot.

## 2021-06-17 NOTE — TELEPHONE ENCOUNTER
"-Coronavirus (COVID-19) Notification    Caller Name (Patient, parent, daughter/son, grandparent, etc)  Patient -patient denied needing the following message as this was discussed 3 weeks ago.  Patient will call the procedure people for follow up plan.     Reason for call  Notify of Positive Coronavirus (COVID-19) lab results, assess symptoms,  review [facility] recommendations    Lab Result    Lab test:  2019-nCoV rRt-PCR or SARS-CoV-2 PCR    Oropharyngeal AND/OR nasopharyngeal swabs is POSITIVE for 2019-nCoV RNA/SARS-COV-2 PCR (COVID-19 virus)    RN Recommendations/Instructions per [facility] Coronavirus COVID-19 recommendations    Brief introduction script  Introduce self and then review script:  \"I am calling on behalf of [employer].  We were notified that your Coronavirus test (COVID-19) for was POSITIVE for the virus.  I have some information to relay to you but first I wanted to mention that the [place] Dept of Health will be contacting you shortly [it's possible MD already called Patient] to talk to you more about how you are feeling and other people you have had contact with who might now also have the virus.  Also, [facility] is Partnering with the [facility] for Covid-19 research, you may be contacted directly by research staff.\"    Assessment (Inquire about Patient's current symptoms)   Assessment   Current Symptoms at time of phone call: (if no symptoms, document No symptoms] None patient had positive test 3 weeks ago,   Symptom onset (if applicable) 5/5/2021     If at time of call, Patients symptoms hare worsened, the Patient should contact 911 or have someone drive them to Emergency Dept promptly:      If Patient calling 911, inform 911 personal that you have tested positive for the Coronavirus (COVID-19).  Place mask on and await 911 to arrive.    If Emergency Dept, If possible, please have another adult drive you to the Emergency Dept but you need to wear mask when in " "contact with other people.      Monoclonal Antibody Administration    You may be eligible to receive a new treatment with a monoclonal antibody for preventing hospitalization in patients at high risk for complications from COVID-19.   This medication is still experimental and available on a limited basis; it is given through an IV and must be given at an infusion center. Please note that not all people who are eligible will receive the medication since it is in limited supply.     Are you interested in being considered for this medication?  No.  Does the patient fit the criteria: No    If patient qualifies based on above criteria:  \"You will be contacted if you are selected to receive this treatment in the next 1-2 business days.   This is time sensitive and if you are not selected in the next 1-2 business days, you will not receive the medication.  If you do not receive a call to schedule, you have not been selected.\"    Review information with Patient    Your result was positive. This means you have COVID-19 (coronavirus).  We have sent you a letter that reviews the information that I'll be reviewing with you now.    How can I protect others?    If you have symptoms: stay home and away from others (self-isolate) until:    You've had no fever--and no medicine that reduces fever--for 1 full day (24 hours). And      Your other symptoms have gotten better. For example, your cough or breathing has improved. And     At least 10 days have passed since your symptoms started. (If you ve been told by a doctor that you have a weak immune system, wait 20 days.)     If you don't have symptoms: Stay home and away from others (self-isolate) until at least 10 days have passed since your first positive COVID-19 test. (Date test collected).    During this time:    Stay in your own room, including for meals. Use your own bathroom if you can.    Stay away from others in your home. No hugging, kissing or shaking hands. No visitors. "     Don't go to work, school or anywhere else.     Clean  high touch  surfaces often (doorknobs, counters, handles, etc.). Use a household cleaning spray or wipes. You'll find a full list on the EPA website at www.epa.gov/pesticide-registration/list-n-disinfectants-use-against-sars-cov-2.     Cover your mouth and nose with a mask, tissue or other face covering to avoid spreading germs.    Wash your hands and face often with soap and water.    Make a list of people you have been in close contact with recently, even if either of you wore a face covering.   ; Start your list from 2 days before you became ill or had a positive test.  ; Include anyone that was within 6 feet of you for a cumulative total of 15 minutes or more in 24 hours. (Example: if you sat next to Pravin for 5 minutes in the morning and 10 minutes in the afternoon, then you were in close contact for 15 minutes total that day. Pravin would be added to your list.)    A public health worker will call or text you. It is important that you answer. They will ask you questions about possible exposures to COVID-19, such as people you have been in direct contact with and places you have visited.    Tell the people on your list that you have COVID-19; they should stay away from others for 14 days starting from the last time they were in contact with you (unless you are told something different from a public health worker).     Caregivers in these groups are at risk for severe illness due to COVID-19:  o People 65 years and older  o People who live in a nursing home or long-term care facility  o People with chronic disease (lung, heart, cancer, diabetes, kidney, liver, immunologic)  o People who have a weakened immune system, including those who:  - Are in cancer treatment  - Take medicine that weakens the immune system, such as corticosteroids  - Had a bone marrow or organ transplant  - Have an immune deficiency  - Have poorly controlled HIV or AIDS  - Are obese  (body mass index of 40 or higher)  - Smoke regularly    Caregivers should wear gloves while washing dishes, handling laundry and cleaning bedrooms and bathrooms.    Wash and dry laundry with special caution. Don't shake dirty laundry, and use the warmest water setting you can.    If you have a weakened immune system, ask your doctor about other actions you should take.    For more tips, go to www.cdc.gov/coronavirus/2019-ncov/downloads/10Things.pdf.    You should not go back to work until you meet the guidelines above for ending your home isolation. You don't need to be retested for COVID-19 before going back to work--studies show that you won't spread the virus if it's been at least 10 days since your symptoms started (or 20 days, if you have a weak immune system).    Employers: This document serves as formal notice of your employee's medical guidelines for going back to work. They must meet the above guidelines before going back to work in person.    How can I take care of myself?    1. Get lots of rest. Drink extra fluids (unless a doctor has told you not to).    2. Take Tylenol (acetaminophen) for fever or pain. If you have liver or kidney problems, ask your family doctor if it's okay to take Tylenol.     Take either:     650 mg (two 325 mg pills) every 4 to 6 hours, or     1,000 mg (two 500 mg pills) every 8 hours as needed.     Note: Don't take more than 3,000 mg in one day. Acetaminophen is found in many medicines (both prescribed and over-the-counter medicines). Read all labels to be sure you don't take too much.    For children, check the Tylenol bottle for the right dose (based on their age or weight).    3. If you have other health problems (like cancer, heart failure, an organ transplant or severe kidney disease): Call your specialty clinic if you don't feel better in the next 2 days.    4. Know when to call 911: Emergency warning signs include:    Trouble breathing or shortness of breath    Pain or  pressure in the chest that doesn't go away    Feeling confused like you haven't felt before, or not being able to wake up    Bluish-colored lips or face    5. Sign up for [employer]. We know it's scary to hear that you have COVID-19. We want to track your symptoms to make sure you're okay over the next 2 weeks. Please look for an email from [employer]--this is a free, online program that we'll use to keep in touch. To sign up, follow the link in the email. Learn more at [email or web address].    Where can I get more information?    [facility]: [email or web address]    Coronavirus Basics: www.health.[facility].[place]/diseases/coronavirus/basics.html    What to Do If You're Sick: www.cdc.gov/coronavirus/2019-ncov/about/steps-when-sick.html    Ending Home Isolation: www.cdc.gov/coronavirus/2019-ncov/hcp/disposition-in-home-patients.html     Caring for Someone with COVID-19: www.cdc.gov/coronavirus/2019-ncov/if-you-are-sick/care-for-someone.html     [facility] clinical trials (COVID-19 research studies): [email or web address]/[email or web address]-[email or web address]     A Positive COVID-19 letter will be sent via [employer] or the Mail.  (Exception, no letters sent to Presurgerical/Preprocedure Patients)    Sindy Kong LPN

## 2021-06-17 NOTE — PROGRESS NOTES
Surgery Scheduled      Surgery/Procedure: OPEN REDUCTION INTERNAL FIXATION, fifth metatarsal left foot    Special Equipment: c-arm    Location: [facility]    Date: 5/5    Time: 4:15    Surgeon: Dr. Cortes    OR Confirmed/ :  Yes with Brit on 5/5    Orders In:  Yes    Entered on [employer] / [email or web address] Calendar:  Yes    Post Op: 5/18    Covid Scheduled: today stat    Blood Thinners Addressed: N/A    Ultrasound Notified for Intraop and order place? n/a    AVS/ Instructions given or sent? yes

## 2021-06-17 NOTE — ANESTHESIA PREPROCEDURE EVALUATION
Anesthesia Evaluation      Patient summary reviewed   No history of anesthetic complications     Airway   Mallampati: II  Neck ROM: full   Pulmonary - negative ROS and normal exam                          Cardiovascular - negative ROS and normal exam   Neuro/Psych    (+) anxiety/panic attacks,     Endo/Other - negative ROS      GI/Hepatic/Renal - negative ROS           Dental - normal exam                        Anesthesia Plan  Planned anesthetic: general mask and peripheral nerve block    ASA 2     Anesthetic plan and risks discussed with: patient    Post-op plan: routine recovery

## 2021-06-18 ENCOUNTER — OFFICE VISIT - HEALTHEAST (OUTPATIENT)
Dept: PODIATRY | Facility: CLINIC | Age: 61
End: 2021-06-18

## 2021-06-18 DIAGNOSIS — S92.352A CLOSED DISPLACED FRACTURE OF FIFTH METATARSAL BONE OF LEFT FOOT, INITIAL ENCOUNTER: ICD-10-CM

## 2021-06-21 NOTE — LETTER
Letter by Renee Lindsey CHW at      Author: Renee Lindsey CHW Service: -- Author Type: --    Filed:  Encounter Date: 5/7/2021 Status: (Other)       CARE COORDINATION  [address]  [facility] [address]    May 7, 2021    Karly Pemberton  [address] [place] [address]      Dear Karly,    I am a clinic community health worker who works with Dorothy Gimenez MD at [facility]. I wanted to thank you for spending the time to talk with me.  Below is a description of clinic care coordination and how I can further assist you.      The clinic care coordination team is made up of a registered nurse, , financial resource guide and community health worker who understand the health care system. The goal of clinic care coordination is to help you manage your health and improve access to the health care system in the most efficient manner. The team can assist you in meeting your health care goals by providing education, coordinating services, strengthening the communication among your providers and supporting you with any resource needs.    Please feel free to contact me at [phone] with any questions or concerns. We are focused on providing you with the highest-quality healthcare experience possible and that all starts with you.     Sincerely,     STEPHANIE Avery  Clinic Care Coordination   [phone]  [email or web address]

## 2021-06-21 NOTE — PROGRESS NOTES
Assessment:     1. Abdominal pain, left lower quadrant  CT Abdomen Pelvis With Oral With IV Contrast    CT Abdomen Pelvis With Oral With IV Contrast   2. Visit for screening mammogram  Mammo Screening Bilateral   3. Dyspareunia, female         Plan:     Ania is a 58-year-old female presenting today with 2 concerns.  One is regarding an intermittent pain in her left side of her abdomen.  This is in a very localized area and can reach an intensity of a 6 or 7 on a scale of 1-10.  She does not have any associated symptoms and it does not seem to be made worse either associated with bowel movements or movement.  The pain comes on at least once a week and has for the past 9 months and seems to be coming out a bit more frequently.  The patient also has a complaint of painful intercourse.  She describes that this is due to dryness and is come on only since reaching menopause.  We discussed the treatment of atrophic vaginitis and treatment options and ultimately a prescription for estrogen cream was provided.  I had the patient make a follow-up appointment for an annual exam in the near future as she is overdue for this.  The patient was also referred for screening mammogram.    Subjective:       58 y.o. female presents for evaluation of menpausal symptoms. She had her LMP about 9 months ago and is struggling with vaginal dryness and painful intercourse.  It has been to the extent that she does not want to have intercourse any longer and is here today to talk about options.  The patient is not having any issues with hot flashes or night sweats.  She does have a family history of a mom with breast cancer diagnosed at 65 and keeps up with breast exams and mammograms.      Reviewed: The following portions of the patient's history were reviewed and updated as appropriate: allergies, current medications, past family history, past medical history, past social history, past surgical history and problem list.    Review of  "Systems  Pertinent items are noted in HPI.        Objective:     /62 (Patient Site: Left Arm, Patient Position: Sitting, Cuff Size: Adult Regular)   Pulse 60   Ht 5' 4\" (1.626 m)   Wt 128 lb (58.1 kg)   BMI 21.97 kg/m    General appearance: alert, appears stated age and cooperative      This note has been dictated using voice recognition software. Any grammatical or context distortions are unintentional and inherent to the software  "

## 2021-06-23 ENCOUNTER — COMMUNICATION - HEALTHEAST (OUTPATIENT)
Dept: FAMILY MEDICINE | Facility: CLINIC | Age: 61
End: 2021-06-23

## 2021-06-23 DIAGNOSIS — N95.2 ATROPHIC VAGINITIS: ICD-10-CM

## 2021-06-23 RX ORDER — ESTRADIOL 0.1 MG/G
CREAM VAGINAL
Qty: 42.5 G | Refills: 3 | Status: SHIPPED | OUTPATIENT
Start: 2021-06-23 | End: 2023-05-18

## 2021-06-24 NOTE — PROGRESS NOTES
"Assessment:     1. Depression, unspecified depression type     2. Essential tremor     3. Screen for colon cancer  Cologuard   4. Eczema, unspecified type         Plan:     Karly is a 58-year-old female presenting today for a routine medication check visit and refills.  We discussed preventive cares today and the patient was given some resources as it relates to getting a screening mammogram as well as a discussion regarding Cologuard.  She will look into those options and follow-up in 1 year.    Subjective:       58 y.o. female presents for a routine medication check visit.  The patient has a history of some mild depression for which she is on fluoxetine 20 mg daily and doing well.  She also has a history of essential tremor for which she takes propranolol 30 mg up to 3 times daily as needed.  She also has a history of eczema and uses topical steroid creams on an as-needed basis for that.  She does not currently have any health insurance and declines colonoscopy for that reason.      Reviewed: The following portions of the patient's history were reviewed and updated as appropriate: allergies, current medications, past family history, past medical history, past social history, past surgical history and problem list.    Review of Systems  Pertinent items are noted in HPI.        Objective:     /74 (Patient Site: Left Arm, Patient Position: Sitting, Cuff Size: Adult Regular)   Pulse 70   Ht 5' 4\" (1.626 m)   Wt 122 lb (55.3 kg)   BMI 20.94 kg/m    General appearance: alert, appears stated age and cooperative      This note has been dictated using voice recognition software. Any grammatical or context distortions are unintentional and inherent to the software  "

## 2021-06-25 ENCOUNTER — COMMUNICATION - HEALTHEAST (OUTPATIENT)
Dept: FAMILY MEDICINE | Facility: CLINIC | Age: 61
End: 2021-06-25

## 2021-06-25 DIAGNOSIS — N95.2 ATROPHIC VAGINITIS: ICD-10-CM

## 2021-06-25 NOTE — TELEPHONE ENCOUNTER
Caller: Patient    Provider: MD Raffaele Cortes    Detailed reason for call: 05/06/21 ORIF fifth metatarsal. She continues to have minor stinging pain at the suture line. Her foot is still swollen and skin is purplish. She states no sudden increase in pain and no redness it has just been the same since surgery. She admits to being quite active. She just wanted to confirm that this expected and if she should be seeing some improvement soon.    Best phone number to contact: [phone]    Best time to contact: Any time    Ok to leave a detailed message: Yes

## 2021-06-25 NOTE — PROGRESS NOTES
Podiatry Progress Note        ASSESSMENT: S/P ORIF 5th metatarsal left foot       TREATMENT:  -Surgical site on the left foot is progressing well. I reviewed x-rays which indicate bony healing on lateral view intact plate and screw fixation.     -Sutures removed, steri-strips applied. Continue non-weight bearing on the left foot.     -She will follow-up with me in 3 weeks for repeat x-rays.    Raffaele Cortes DPM  [facility]/Foot & Ankle Surgery      HPI: Karly Pemberton was seen again today s/p ORIF left 5th metatarsal. She is doing well and has remained non-weight bearing.    Past Medical History:   Diagnosis Date     Fibrocystic breast      Hereditary essential tremor     left upper extremity       Allergies   Allergen Reactions     Compazine [Prochlorperazine Edisylate] Other (See Comments)     Per patient frozen face          Current Outpatient Medications:      estradioL (ESTRACE) 0.01 % (0.1 mg/gram) vaginal cream, Insert .5-1 GM twice weekly intravaginally, Disp: 42.5 g, Rfl: 1     FLUoxetine (PROZAC) 10 MG capsule, Take 1 capsule (10 mg total) by mouth daily., Disp: 90 capsule, Rfl: 3     FLUoxetine (PROZAC) 20 MG capsule, Take 1 capsule (20 mg total) by mouth daily., Disp: 90 capsule, Rfl: 3     ketoconazole (NIZORAL) 2 % cream, Apply topically daily., Disp: 15 g, Rfl: 0     oxyCODONE (ROXICODONE) 5 MG immediate release tablet, Take 1 tablet (5 mg total) by mouth every 6 (six) hours as needed for pain., Disp: 30 tablet, Rfl: 0     propranoloL (INDERAL) 40 MG tablet, Take 1 tablet (40 mg total) by mouth 2 (two) times a day., Disp: 180 tablet, Rfl: 3    Review of Systems - negative       OBJECTIVE:  Appearance: NAD    Vitals:    05/28/21 1511   BP: 126/76   Pulse: (!) 104   Temp: 98.5  F (36.9  C)       Surgical site on the left foot has intact sutures with skin edges well coapted, no gapping noted. No erythema left foot. Neurovascular status unchanged left foot.

## 2021-06-25 NOTE — TELEPHONE ENCOUNTER
Patient informed these are typical post op symptoms. She stated she is not overly concerned and will discuss at her already scheduled post op on 06/18/21.

## 2021-06-26 ENCOUNTER — HEALTH MAINTENANCE LETTER (OUTPATIENT)
Age: 61
End: 2021-06-26

## 2021-06-26 NOTE — PATIENT INSTRUCTIONS - HE
Please continue to remain non-weight bearing and utilize the CAM boot, and crutches or rolling knee walker.

## 2021-06-26 NOTE — PROGRESS NOTES
Podiatry Progress Note        ASSESSMENT: S/P ORIF 5th metatarsal left foot       TREATMENT:  -Surgical site on the left foot is progressing well. I reviewed x-rays which indicate bony healing on lateral view intact plate and screw fixation.     -I recommend continue non-weight bearing on the left foot.     -She will follow-up with me in 3 weeks for repeat x-rays.    Raffaele Cortes DPM  [facility] Podiatry/Foot & Ankle Surgery      HPI: Karly Pemberton was seen again today s/p ORIF left 5th metatarsal. She is doing well and has remained non-weight bearing.    Past Medical History:   Diagnosis Date     Fibrocystic breast      Hereditary essential tremor     left upper extremity       Allergies   Allergen Reactions     Compazine [Prochlorperazine Edisylate] Other (See Comments)     Per patient frozen face          Current Outpatient Medications:      estradioL (ESTRACE) 0.01 % (0.1 mg/gram) vaginal cream, Insert .5-1 GM twice weekly intravaginally, Disp: 42.5 g, Rfl: 1     FLUoxetine (PROZAC) 10 MG capsule, Take 1 capsule (10 mg total) by mouth daily., Disp: 90 capsule, Rfl: 3     FLUoxetine (PROZAC) 20 MG capsule, Take 1 capsule (20 mg total) by mouth daily., Disp: 90 capsule, Rfl: 3     propranoloL (INDERAL) 40 MG tablet, Take 1 tablet (40 mg total) by mouth 2 (two) times a day., Disp: 180 tablet, Rfl: 3    Review of Systems - negative       OBJECTIVE:  Appearance: NAD    Vitals:    06/18/21 1444   BP: 106/62   Pulse: 60   Resp: 16   Temp: 98.5  F (36.9  C)       Surgical site on the left foot is well coapted, no gapping noted. No erythema left foot. Neurovascular status unchanged left foot.

## 2021-06-26 NOTE — PATIENT INSTRUCTIONS - HE
Get x-rays in [address] prior to next appointment.    Remain non-weightbearing on left foot with CAM boot on.    Use crutches or rolling knee walker.

## 2021-07-03 NOTE — ADDENDUM NOTE
Addendum Note by Dorothy Gimenez MD at 6/21/2019 11:33 AM     Author: Dorothy Gimenez MD Service: -- Author Type: Physician    Filed: 6/21/2019 11:33 AM Encounter Date: 6/14/2019 Status: Signed    : Dorothy Gimenez MD (Physician)    Addended by: DOROTHY GIMENEZ on: 6/21/2019 11:33 AM        Modules accepted: Orders

## 2021-07-03 NOTE — ADDENDUM NOTE
Addendum Note by Mauricio De Jesus CMA at 11/30/2018  3:08 PM     Author: Mauricio De Jesus CMA Service: -- Author Type: Medical Assistant    Filed: 11/30/2018  3:08 PM Encounter Date: 11/26/2018 Status: Signed    : Mauricio De Jesus CMA (Medical Assistant)    Addended by: MAURICIO DE JESUS on: 11/30/2018 03:08 PM        Modules accepted: Orders

## 2021-07-03 NOTE — ADDENDUM NOTE
Addendum Note by Dorothy Gimenez MD at 11/28/2018  3:00 PM     Author: Dorothy Gimenez MD Service: -- Author Type: Physician    Filed: 11/28/2018  3:00 PM Encounter Date: 11/26/2018 Status: Signed    : Dorothy Gimenez MD (Physician)    Addended by: DOROTHY GIMENEZ on: 11/28/2018 03:00 PM        Modules accepted: Orders

## 2021-07-03 NOTE — ADDENDUM NOTE
Addendum Note by Dorothy Gimenez MD at 11/28/2018  1:21 PM     Author: Dorothy Gimenez MD Service: -- Author Type: Physician    Filed: 11/28/2018  1:21 PM Encounter Date: 11/26/2018 Status: Signed    : Dorothy Gimenez MD (Physician)    Addended by: DOROTHY GIMENEZ on: 11/28/2018 01:21 PM        Modules accepted: Orders

## 2021-07-06 VITALS
SYSTOLIC BLOOD PRESSURE: 126 MMHG | BODY MASS INDEX: 19.91 KG/M2 | TEMPERATURE: 98.5 F | HEART RATE: 104 BPM | DIASTOLIC BLOOD PRESSURE: 76 MMHG | WEIGHT: 116 LBS

## 2021-07-06 VITALS
SYSTOLIC BLOOD PRESSURE: 106 MMHG | TEMPERATURE: 98.5 F | RESPIRATION RATE: 16 BRPM | HEART RATE: 60 BPM | DIASTOLIC BLOOD PRESSURE: 62 MMHG

## 2021-09-24 DIAGNOSIS — M79.672 LEFT FOOT PAIN: Primary | ICD-10-CM

## 2021-09-27 ENCOUNTER — ANCILLARY PROCEDURE (OUTPATIENT)
Dept: RADIOLOGY | Facility: CLINIC | Age: 61
End: 2021-09-27
Attending: PODIATRIST

## 2021-09-27 ENCOUNTER — OFFICE VISIT (OUTPATIENT)
Dept: PODIATRY | Facility: CLINIC | Age: 61
End: 2021-09-27

## 2021-09-27 VITALS — BODY MASS INDEX: 19.97 KG/M2 | HEIGHT: 64 IN | HEART RATE: 61 BPM | WEIGHT: 117 LBS | OXYGEN SATURATION: 98 %

## 2021-09-27 DIAGNOSIS — S92.515A CLOSED NONDISPLACED FRACTURE OF PROXIMAL PHALANX OF LESSER TOE OF LEFT FOOT, INITIAL ENCOUNTER: Primary | ICD-10-CM

## 2021-09-27 PROCEDURE — 99213 OFFICE O/P EST LOW 20 MIN: CPT | Performed by: PODIATRIST

## 2021-09-27 ASSESSMENT — MIFFLIN-ST. JEOR: SCORE: 1080.71

## 2021-09-27 ASSESSMENT — PAIN SCALES - GENERAL: PAINLEVEL: MILD PAIN (3)

## 2021-09-27 NOTE — PROGRESS NOTES
FOOT AND ANKLE SURGERY/PODIATRY PROGRESS NOTE        ASSESSMENT: Non-displaced fracture 5th digit left foot      TREATMENT:  -There is pain along the 5th digit on the left foot. I reviewed the patient's x-rays which indicate a non-displaced fracture of the head and base of the proximal phalanx 5th digit. Intact plate and screw fixation along 5th metatarsal with bony consolidation noted.     -I reviewed the above with the patient and recommend she remain limited to non-weight bearing on the left foot with knee walker and CAM boot/surgical shoe. No surgical intervention required at this time.    -Patient's questions invited and answered. Patient to return to clinic in 3 week(s) for re-evaluation. She was encouraged to call my office with any further questions or concerns.     Raffaele Cortes DPM  [facility] Podiatry/Foot & Ankle Surgery  [phone]      HPI: Karly Pemberton was seen again today for new pain along the 5th digit left foot which started 3 weeks ago after bumping her toe. She has noticed improving pain but wanted to get the toe looked at.     Past Medical History:   Diagnosis Date     Fibrocystic breast      Hereditary essential tremor     left upper extremity       Past Surgical History:   Procedure Laterality Date     C LIGATE FALLOPIAN TUBE      Description: Tubal Ligation;  Recorded: 09/04/2014;     OPEN REDUCTION INTERNAL FIXATION FOOT Left 5/6/2021    Procedure: OPEN REDUCTION INTERNAL FIXATION, fifth metatarsal left foot;  Surgeon: Raffaele Cortes DPM;  Location: [facility];  Service: Podiatry     TUBAL LIGATION         Allergies   Allergen Reactions     Compazine [Prochlorperazine] Other (See Comments)     Per patient frozen face          Current Outpatient Medications:      FLUoxetine (PROZAC) 10 MG capsule, [FLUOXETINE (PROZAC) 10 MG CAPSULE] Take 1 capsule (10 mg total) by mouth daily., Disp: 90 capsule, Rfl: 3     FLUoxetine (PROZAC) 20 MG capsule, [FLUOXETINE (PROZAC) 20  MG CAPSULE] Take 1 capsule (20 mg total) by mouth daily., Disp: 90 capsule, Rfl: 3     ketoconazole (NIZORAL) 2 % cream, [KETOCONAZOLE (NIZORAL) 2 % CREAM] Apply topically daily., Disp: 15 g, Rfl: 0     propranoloL (INDERAL) 40 MG tablet, [PROPRANOLOL (INDERAL) 40 MG TABLET] Take 1 tablet (40 mg total) by mouth 2 (two) times a day., Disp: 180 tablet, Rfl: 3     conjugated estrogens (PREMARIN) vaginal cream, [CONJUGATED ESTROGENS (PREMARIN) VAGINAL CREAM] Apply .5-1gm intravaginally 1-2 times weekly, Disp: 42.5 g, Rfl: 1     estradioL (ESTRACE) 0.01 % (0.1 mg/gram) vaginal cream, [ESTRADIOL (ESTRACE) 0.01 % (0.1 MG/GRAM) VAGINAL CREAM] Insert .5-1 GM twice weekly intravaginally, Disp: 42.5 g, Rfl: 3     estradioL (ESTRACE) 0.01 % (0.1 mg/gram) vaginal cream, [ESTRADIOL (ESTRACE) 0.01 % (0.1 MG/GRAM) VAGINAL CREAM] Insert .5-1 GM twice weekly intravaginally, Disp: 42.5 g, Rfl: 1     oxyCODONE (ROXICODONE) 5 MG immediate release tablet, [OXYCODONE (ROXICODONE) 5 MG IMMEDIATE RELEASE TABLET] Take 1 tablet (5 mg total) by mouth every 6 (six) hours as needed for pain., Disp: 30 tablet, Rfl: 0    Family History   Problem Relation Age of Onset     Breast Cancer Mother 65.00       Social History     Socioeconomic History     Marital status:      Spouse name: Not on file     Number of children: Not on file     Years of education: Not on file     Highest education level: Not on file   Occupational History     Not on file   Tobacco Use     Smoking status: Former Smoker     Smokeless tobacco: Never Used     Tobacco comment: Quit 42 yrs ago   Substance and Sexual Activity     Alcohol use: No     Drug use: No     Sexual activity: Yes     Partners: Male   Other Topics Concern     Not on file   Social History Narrative     Not on file     Social Determinants of Health     Financial Resource Strain:      Difficulty of Paying Living Expenses:    Food Insecurity:      Worried About Running Out of Food in the Last Year:      Ran  "Out of Food in the Last Year:    Transportation Needs:      Lack of Transportation (Medical):      Lack of Transportation (Non-Medical):    Physical Activity:      Days of Exercise per Week:      Minutes of Exercise per Session:    Stress:      Feeling of Stress :    Social Connections:      Frequency of Communication with Friends and Family:      Frequency of Social Gatherings with Friends and Family:      Attends Jainism Services:      Active Member of Clubs or Organizations:      Attends Club or Organization Meetings:      Marital Status:    Intimate Partner Violence:      Fear of Current or Ex-Partner:      Emotionally Abused:      Physically Abused:      Sexually Abused:        10 point Review of Systems is negative except for left foot pain which is noted in HPI.     Pulse 61   Ht 1.626 m (5' 4\")   Wt 53.1 kg (117 lb)   SpO2 98%   BMI 20.08 kg/m      BMI= Body mass index is 20.08 kg/m .    OBJECTIVE:  General appearance: Patient is alert and fully cooperative with history & exam.  No sign of distress is noted during the visit.    Vascular: Dorsalis pedis and posterior tibial pulses are palpable. There is pedal hair growth left.  CFT < 3 sec from anterior tibial surface to distal digits left. There is no appreciable edema noted.  Dermatologic: Turgor and texture are within normal limits. No coloration or temperature changes. No primary or secondary lesions noted.  Neurologic: All epicritic and proprioceptive sensations are grossly intact left.  Musculoskeletal: Pain to palpation 5th digit left foot along entirety of the digit. No pain at 5th MPJ or along 5th metatarsal. 5th digit does sit slightly dorsal to 4th digit.     "

## 2021-09-27 NOTE — PATIENT INSTRUCTIONS
Limited walking on Left  foot with CAM boot. Okay for transferring and ambulating inside home but no community ambulation.

## 2021-09-27 NOTE — LETTER
9/27/2021         RE: Karly Pemberton  [address] [facility] [address]        Dear Colleague,    Thank you for referring your patient, Karly Pemberton, to the [facility]. Please see a copy of my visit note below.        FOOT AND ANKLE SURGERY/PODIATRY PROGRESS NOTE        ASSESSMENT: Non-displaced fracture 5th digit left foot      TREATMENT:  -There is pain along the 5th digit on the left foot. I reviewed the patient's x-rays which indicate a non-displaced fracture of the head and base of the proximal phalanx 5th digit. Intact plate and screw fixation along 5th metatarsal with bony consolidation noted.     -I reviewed the above with the patient and recommend she remain limited to non-weight bearing on the left foot with knee walker and CAM boot/surgical shoe. No surgical intervention required at this time.    -Patient's questions invited and answered. Patient to return to clinic in 3 week(s) for re-evaluation. She was encouraged to call my office with any further questions or concerns.     aRffaele Cortes DPM  [facility] Podiatry/Foot & Ankle Surgery  [phone]      HPI: Karly Pemberton was seen again today for new pain along the 5th digit left foot which started 3 weeks ago after bumping her toe. She has noticed improving pain but wanted to get the toe looked at.     Past Medical History:   Diagnosis Date     Fibrocystic breast      Hereditary essential tremor     left upper extremity       Past Surgical History:   Procedure Laterality Date     C LIGATE FALLOPIAN TUBE      Description: Tubal Ligation;  Recorded: 09/04/2014;     OPEN REDUCTION INTERNAL FIXATION FOOT Left 5/6/2021    Procedure: OPEN REDUCTION INTERNAL FIXATION, fifth metatarsal left foot;  Surgeon: Raffaele Cortes DPM;  Location: [facility];  Service: Podiatry     TUBAL LIGATION         Allergies   Allergen Reactions     Compazine [Prochlorperazine] Other (See Comments)     Per patient frozen face           Current Outpatient Medications:      FLUoxetine (PROZAC) 10 MG capsule, [FLUOXETINE (PROZAC) 10 MG CAPSULE] Take 1 capsule (10 mg total) by mouth daily., Disp: 90 capsule, Rfl: 3     FLUoxetine (PROZAC) 20 MG capsule, [FLUOXETINE (PROZAC) 20 MG CAPSULE] Take 1 capsule (20 mg total) by mouth daily., Disp: 90 capsule, Rfl: 3     ketoconazole (NIZORAL) 2 % cream, [KETOCONAZOLE (NIZORAL) 2 % CREAM] Apply topically daily., Disp: 15 g, Rfl: 0     propranoloL (INDERAL) 40 MG tablet, [PROPRANOLOL (INDERAL) 40 MG TABLET] Take 1 tablet (40 mg total) by mouth 2 (two) times a day., Disp: 180 tablet, Rfl: 3     conjugated estrogens (PREMARIN) vaginal cream, [CONJUGATED ESTROGENS (PREMARIN) VAGINAL CREAM] Apply .5-1gm intravaginally 1-2 times weekly, Disp: 42.5 g, Rfl: 1     estradioL (ESTRACE) 0.01 % (0.1 mg/gram) vaginal cream, [ESTRADIOL (ESTRACE) 0.01 % (0.1 MG/GRAM) VAGINAL CREAM] Insert .5-1 GM twice weekly intravaginally, Disp: 42.5 g, Rfl: 3     estradioL (ESTRACE) 0.01 % (0.1 mg/gram) vaginal cream, [ESTRADIOL (ESTRACE) 0.01 % (0.1 MG/GRAM) VAGINAL CREAM] Insert .5-1 GM twice weekly intravaginally, Disp: 42.5 g, Rfl: 1     oxyCODONE (ROXICODONE) 5 MG immediate release tablet, [OXYCODONE (ROXICODONE) 5 MG IMMEDIATE RELEASE TABLET] Take 1 tablet (5 mg total) by mouth every 6 (six) hours as needed for pain., Disp: 30 tablet, Rfl: 0    Family History   Problem Relation Age of Onset     Breast Cancer Mother 65.00       Social History     Socioeconomic History     Marital status:      Spouse name: Not on file     Number of children: Not on file     Years of education: Not on file     Highest education level: Not on file   Occupational History     Not on file   Tobacco Use     Smoking status: Former Smoker     Smokeless tobacco: Never Used     Tobacco comment: Quit 42 yrs ago   Substance and Sexual Activity     Alcohol use: No     Drug use: No     Sexual activity: Yes     Partners: Male   Other Topics Concern      "Not on file   Social History Narrative     Not on file     Social Determinants of Health     Financial Resource Strain:      Difficulty of Paying Living Expenses:    Food Insecurity:      Worried About Running Out of Food in the Last Year:      Ran Out of Food in the Last Year:    Transportation Needs:      Lack of Transportation (Medical):      Lack of Transportation (Non-Medical):    Physical Activity:      Days of Exercise per Week:      Minutes of Exercise per Session:    Stress:      Feeling of Stress :    Social Connections:      Frequency of Communication with Friends and Family:      Frequency of Social Gatherings with Friends and Family:      Attends Amish Services:      Active Member of Clubs or Organizations:      Attends Club or Organization Meetings:      Marital Status:    Intimate Partner Violence:      Fear of Current or Ex-Partner:      Emotionally Abused:      Physically Abused:      Sexually Abused:        10 point Review of Systems is negative except for left foot pain which is noted in HPI.     Pulse 61   Ht 1.626 m (5' 4\")   Wt 53.1 kg (117 lb)   SpO2 98%   BMI 20.08 kg/m      BMI= Body mass index is 20.08 kg/m .    OBJECTIVE:  General appearance: Patient is alert and fully cooperative with history & exam.  No sign of distress is noted during the visit.    Vascular: Dorsalis pedis and posterior tibial pulses are palpable. There is pedal hair growth left.  CFT < 3 sec from anterior tibial surface to distal digits left. There is no appreciable edema noted.  Dermatologic: Turgor and texture are within normal limits. No coloration or temperature changes. No primary or secondary lesions noted.  Neurologic: All epicritic and proprioceptive sensations are grossly intact left.  Musculoskeletal: Pain to palpation 5th digit left foot along entirety of the digit. No pain at 5th MPJ or along 5th metatarsal. 5th digit does sit slightly dorsal to 4th digit.         Again, thank you for allowing me to " participate in the care of your patient.        Sincerely,        Raffaele Cortes DPM

## 2021-10-16 ENCOUNTER — HEALTH MAINTENANCE LETTER (OUTPATIENT)
Age: 61
End: 2021-10-16

## 2021-11-01 ENCOUNTER — OFFICE VISIT (OUTPATIENT)
Dept: FAMILY MEDICINE | Facility: CLINIC | Age: 61
End: 2021-11-01

## 2021-11-01 VITALS
BODY MASS INDEX: 20.25 KG/M2 | DIASTOLIC BLOOD PRESSURE: 64 MMHG | OXYGEN SATURATION: 98 % | SYSTOLIC BLOOD PRESSURE: 100 MMHG | WEIGHT: 118 LBS | HEART RATE: 91 BPM

## 2021-11-01 DIAGNOSIS — H92.03 OTALGIA OF BOTH EARS: ICD-10-CM

## 2021-11-01 DIAGNOSIS — G25.0 ESSENTIAL TREMOR: ICD-10-CM

## 2021-11-01 DIAGNOSIS — F41.1 GAD (GENERALIZED ANXIETY DISORDER): ICD-10-CM

## 2021-11-01 DIAGNOSIS — M79.642 PAIN IN BOTH HANDS: Primary | ICD-10-CM

## 2021-11-01 DIAGNOSIS — M79.641 PAIN IN BOTH HANDS: Primary | ICD-10-CM

## 2021-11-01 PROCEDURE — 99213 OFFICE O/P EST LOW 20 MIN: CPT | Performed by: FAMILY MEDICINE

## 2021-11-01 RX ORDER — PRIMIDONE 50 MG/1
50 TABLET ORAL 2 TIMES DAILY
Qty: 180 TABLET | Refills: 1 | Status: SHIPPED | OUTPATIENT
Start: 2021-11-01 | End: 2022-10-10

## 2021-11-01 RX ORDER — FLUOXETINE 10 MG/1
10 CAPSULE ORAL DAILY
Qty: 90 CAPSULE | Refills: 3 | Status: SHIPPED | OUTPATIENT
Start: 2021-11-01 | End: 2023-05-25

## 2021-11-01 ASSESSMENT — ANXIETY QUESTIONNAIRES
2. NOT BEING ABLE TO STOP OR CONTROL WORRYING: NOT AT ALL
6. BECOMING EASILY ANNOYED OR IRRITABLE: NOT AT ALL
GAD7 TOTAL SCORE: 0
GAD7 TOTAL SCORE: 0
5. BEING SO RESTLESS THAT IT IS HARD TO SIT STILL: NOT AT ALL
7. FEELING AFRAID AS IF SOMETHING AWFUL MIGHT HAPPEN: NOT AT ALL
4. TROUBLE RELAXING: NOT AT ALL
3. WORRYING TOO MUCH ABOUT DIFFERENT THINGS: NOT AT ALL
7. FEELING AFRAID AS IF SOMETHING AWFUL MIGHT HAPPEN: NOT AT ALL
GAD7 TOTAL SCORE: 0
1. FEELING NERVOUS, ANXIOUS, OR ON EDGE: NOT AT ALL

## 2021-11-01 NOTE — ASSESSMENT & PLAN NOTE
Stable on 30mg fluoxetine daily. Patient endorses excellent mood and satisfaction with her medications.

## 2021-11-01 NOTE — ASSESSMENT & PLAN NOTE
Will trial primidone for essential tremor as patient has not had good results with the propranolol. Discussed slow taper up on the medication over the next four weeks. She can stop at the lowest dose that she experiences relief of symptoms. She will cut her propranolol in half over the next week and then discontinue.

## 2021-11-01 NOTE — PROGRESS NOTES
Assessment & Plan     Pain in both hands  With significant point tenderness to the carpometacarpal joint exhibited on exam, patient's presentation is most consistent with early osteoarthritis of the FCI joint. Discussed that she can try glucosamine sulfate supplementation and diclofenac gel for pain management. She can also utilize a thumb spica splint, especially with repetitive movements, to immobilize the joint and help with pain.     Essential tremor  Will trial primidone for essential tremor as patient has not had good results with the propranolol. Discussed slow taper up on the medication over the next four weeks. She can stop at the lowest dose that she experiences relief of symptoms. She will cut her propranolol in half over the next week and then discontinue.  - primidone (MYSOLINE) 50 MG tablet; Take 1 tablet (50 mg) by mouth 2 times daily    JULIETA (generalized anxiety disorder)  Refilled medication today. Patient endorses excellent mood and satisfaction with her medications.  - FLUoxetine (PROZAC) 20 MG capsule; Take 1 capsule (20 mg) by mouth daily  - FLUoxetine (PROZAC) 10 MG capsule; Take 1 capsule (10 mg) by mouth daily    Otalgia of both ears  Patient struggles with sinus dysfunction and drainage. Ears do not appear infected in clinic today. Discussed the use of over the counter flonase or another decongestant to help relieve symptoms.      Return in about 4 weeks (around 11/29/2021) for follow up and medication check.    Rhea Soto DNP-FNP Student on 11/1/2021 at 2:20 PM    MARY TAFOYA  [facility]t is a 61 year old who presents for the following health issues:    HPI     Patient was scraping tape off a window for an extended amount of time 6 months ago which is when she first noticed the pain. She believes it has become more bothersome, prompting her clinic appointment. It is bilateral and involved the thumb and pointer fingers. Describes  the pain as moderate discomfort with occasionally cramping. The pain comes out and goes. Certain movements such as grabbing objects or when her hands are hanging at her sides will worsen the pain. Her left hand is worse than right. She has tried OTC ibuprofen when she initially noticed the pain, which maybe helped a little. Endorses some associated stiffness, but denies swelling, warmth, erythema, or paresthesia in her hands.    Patient is also having bilateral ear pain; she states she has been flying frequently over the last few months and is wondering if it is related. She endorses frequently sinus drainage and pressure, which is a chronic issue for her. She denies any fever, drainage from the ear or hearing changes.     Additionally, patient has been taking 40mg propranolol for her right arm tremor but has not noticed much of an improvement.     Review of Systems   Constitutional, HEENT, cardiovascular, pulmonary, gi and gu systems are negative, except as otherwise noted.      Objective    /64 (BP Location: Left arm, Patient Position: Left side, Cuff Size: Adult Regular)   Pulse 91   Wt 53.5 kg (118 lb)   SpO2 98%   BMI 20.25 kg/m    Body mass index is 20.25 kg/m .  Physical Exam   GENERAL: healthy, alert and no distress  HEENT: bilateral TM normal with no bulging, bony landmarks are visible with no erythema, no drainage  MS: No swelling or erythema appreciated to bilateral hands. Point tenderness to the carpometacarpal joint bilaterally, left > right. No decreased ROM appreciated.  PSYCH: mentation appears normal, affect normal/bright    ----- Services Performed by a NURSE PRACTIONER STUDENT in Presence of ATTENDING Physician-------

## 2021-11-02 ASSESSMENT — ANXIETY QUESTIONNAIRES: GAD7 TOTAL SCORE: 0

## 2022-03-07 ENCOUNTER — MYC MEDICAL ADVICE (OUTPATIENT)
Dept: FAMILY MEDICINE | Facility: CLINIC | Age: 62
End: 2022-03-07

## 2022-03-07 DIAGNOSIS — G25.0 ESSENTIAL TREMOR: Primary | ICD-10-CM

## 2022-03-07 RX ORDER — PROPRANOLOL HYDROCHLORIDE 60 MG/1
60 TABLET ORAL 2 TIMES DAILY
Qty: 60 TABLET | Refills: 1 | Status: SHIPPED | OUTPATIENT
Start: 2022-03-07 | End: 2022-08-05

## 2022-03-18 ENCOUNTER — TRANSFERRED RECORDS (OUTPATIENT)
Dept: HEALTH INFORMATION MANAGEMENT | Facility: CLINIC | Age: 62
End: 2022-03-18

## 2022-07-23 ENCOUNTER — HEALTH MAINTENANCE LETTER (OUTPATIENT)
Age: 62
End: 2022-07-23

## 2022-08-04 DIAGNOSIS — G25.0 ESSENTIAL TREMOR: ICD-10-CM

## 2022-08-05 RX ORDER — PROPRANOLOL HYDROCHLORIDE 60 MG/1
60 TABLET ORAL 2 TIMES DAILY
Qty: 60 TABLET | Refills: 0 | Status: SHIPPED | OUTPATIENT
Start: 2022-08-05 | End: 2022-10-25

## 2022-08-05 NOTE — TELEPHONE ENCOUNTER
"Routing refill request to provider for review/approval because:  Appears to have been prescribed as a trial 3/7/22  Break in medication    Last Written Prescription Date: 3/7/22  Last Fill Quantity: 60, # refills: 1  Last office visit provider: Ammy 11/1/21        Requested Prescriptions   Pending Prescriptions Disp Refills    propranolol (INDERAL) 60 MG tablet [Pharmacy Med Name: Propranolol HCl Oral Tablet 60 MG] 60 tablet 0     Sig: Take 1 tablet (60 mg) by mouth 2 times daily        Beta-Blockers Protocol Passed - 8/4/2022  8:49 PM        Passed - Blood pressure under 140/90 in past 12 months       BP Readings from Last 3 Encounters:   11/01/21 100/64   07/09/21 110/70   06/18/21 106/62                 Passed - Patient is age 6 or older        Passed - Recent (12 mo) or future (30 days) visit within the authorizing provider's specialty     Patient has had an office visit with the authorizing provider or a provider within the authorizing providers department within the previous 12 mos or has a future within next 30 days. See \"Patient Info\" tab in inbasket, or \"Choose Columns\" in Meds & Orders section of the refill encounter.              Passed - Medication is active on med list                  Angelita Higgins RN 08/05/22 11:19 AM    "

## 2022-10-01 ENCOUNTER — HEALTH MAINTENANCE LETTER (OUTPATIENT)
Age: 62
End: 2022-10-01

## 2022-10-09 ENCOUNTER — E-VISIT (OUTPATIENT)
Dept: FAMILY MEDICINE | Facility: CLINIC | Age: 62
End: 2022-10-09

## 2022-10-09 DIAGNOSIS — B02.9 HERPES ZOSTER WITHOUT COMPLICATION: Primary | ICD-10-CM

## 2022-10-09 PROCEDURE — 99421 OL DIG E/M SVC 5-10 MIN: CPT | Performed by: FAMILY MEDICINE

## 2022-10-23 DIAGNOSIS — G25.0 ESSENTIAL TREMOR: ICD-10-CM

## 2022-10-25 RX ORDER — PROPRANOLOL HYDROCHLORIDE 60 MG/1
TABLET ORAL
Qty: 60 TABLET | Refills: 0 | Status: SHIPPED | OUTPATIENT
Start: 2022-10-25 | End: 2022-12-29

## 2022-10-25 NOTE — TELEPHONE ENCOUNTER
"Due to be seen    Last Written Prescription Date:  8/5/22  Last Fill Quantity: 60,  # refills: 0   Last office visit provider:  11/1/21     Requested Prescriptions   Pending Prescriptions Disp Refills     propranolol (INDERAL) 60 MG tablet [Pharmacy Med Name: Propranolol HCl Oral Tablet 60 MG] 60 tablet 0     Sig: TAKE ONE TABLET BY MOUTH TWICE DAILY       Beta-Blockers Protocol Passed - 10/23/2022  3:28 PM        Passed - Blood pressure under 140/90 in past 12 months     BP Readings from Last 3 Encounters:   11/01/21 100/64   07/09/21 110/70   06/18/21 106/62                 Passed - Patient is age 6 or older        Passed - Recent (12 mo) or future (30 days) visit within the authorizing provider's specialty     Patient has had an office visit with the authorizing provider or a provider within the authorizing providers department within the previous 12 mos or has a future within next 30 days. See \"Patient Info\" tab in inbasket, or \"Choose Columns\" in Meds & Orders section of the refill encounter.              Passed - Medication is active on med list             Noelle Tejada RN 10/24/22 9:18 PM  "

## 2022-12-27 DIAGNOSIS — G25.0 ESSENTIAL TREMOR: ICD-10-CM

## 2022-12-27 NOTE — TELEPHONE ENCOUNTER
"Routing refill request to provider for review/approval because:  Patient needs to be seen because it has been more than 1 year since last office visit.    Last Written Prescription Date:  10/25/22  Last Fill Quantity: 60,  # refills: 0   Last office visit provider:  11/1/21     Requested Prescriptions   Pending Prescriptions Disp Refills     propranolol (INDERAL) 60 MG tablet [Pharmacy Med Name: Propranolol HCl Oral Tablet 60 MG] 60 tablet 0     Sig: TAKE ONE TABLET BY MOUTH TWICE DAILY       Beta-Blockers Protocol Failed - 12/27/2022 12:06 PM        Failed - Blood pressure under 140/90 in past 12 months     BP Readings from Last 3 Encounters:   11/01/21 100/64   07/09/21 110/70   06/18/21 106/62                 Passed - Patient is age 6 or older        Passed - Recent (12 mo) or future (30 days) visit within the authorizing provider's specialty     Patient has had an office visit with the authorizing provider or a provider within the authorizing providers department within the previous 12 mos or has a future within next 30 days. See \"Patient Info\" tab in inbasket, or \"Choose Columns\" in Meds & Orders section of the refill encounter.              Passed - Medication is active on med list             Cary Leonardo RN 12/27/22 12:24 PM  "

## 2022-12-29 RX ORDER — PROPRANOLOL HYDROCHLORIDE 60 MG/1
TABLET ORAL
Qty: 60 TABLET | Refills: 3 | Status: SHIPPED | OUTPATIENT
Start: 2022-12-29 | End: 2023-05-25

## 2023-01-17 ENCOUNTER — TRANSFERRED RECORDS (OUTPATIENT)
Dept: HEALTH INFORMATION MANAGEMENT | Facility: CLINIC | Age: 63
End: 2023-01-17

## 2023-05-17 DIAGNOSIS — N95.2 ATROPHIC VAGINITIS: ICD-10-CM

## 2023-05-18 RX ORDER — ESTRADIOL 0.1 MG/G
CREAM VAGINAL
Qty: 42.5 G | Refills: 0 | Status: SHIPPED | OUTPATIENT
Start: 2023-05-18 | End: 2023-10-09

## 2023-05-18 NOTE — TELEPHONE ENCOUNTER
"Routing refill request to provider for review/approval because:  BP not under 140/90 in the past 12 mo  Has been more than 2 yrs since last Mammogram (last was 5/05/21)    Last Written Prescription Date:  6/23/21  Last Fill Quantity: 42.5G,  # refills: 3   Last office visit provider:  11/01/21 w/ Dr Gimenez (upcoming appt 5/25/23)    Requested Prescriptions   Pending Prescriptions Disp Refills     estradiol (ESTRACE) 0.1 MG/GM vaginal cream [Pharmacy Med Name: Estradiol Vaginal Cream 0.1 MG/GM] 42.5 g 0     Sig: INSERT 0.5 TO 1 GRAM INTRAVAGINALLY TWICE WEEKLY.       Hormone Replacement Therapy Failed - 5/17/2023  9:43 AM        Failed - Blood pressure under 140/90 in past 12 months     BP Readings from Last 3 Encounters:   11/01/21 100/64   07/09/21 110/70   06/18/21 106/62                 Failed - Patient has mammogram in past 2 years on file if age 50-75        Passed - Recent (12 mo) or future (30 days) visit within the authorizing provider's specialty     Patient has had an office visit with the authorizing provider or a provider within the authorizing providers department within the previous 12 mos or has a future within next 30 days. See \"Patient Info\" tab in inbasket, or \"Choose Columns\" in Meds & Orders section of the refill encounter.              Passed - Medication is active on med list        Passed - Patient is 18 years of age or older        Passed - No active pregnancy on record        Passed - No positive pregnancy test on record in past 12 months             Deanne Diaz RN 05/18/23 10:09 AM  "

## 2023-05-25 ENCOUNTER — OFFICE VISIT (OUTPATIENT)
Dept: FAMILY MEDICINE | Facility: CLINIC | Age: 63
End: 2023-05-25

## 2023-05-25 VITALS
DIASTOLIC BLOOD PRESSURE: 68 MMHG | BODY MASS INDEX: 20.25 KG/M2 | SYSTOLIC BLOOD PRESSURE: 99 MMHG | OXYGEN SATURATION: 98 % | HEART RATE: 110 BPM | WEIGHT: 118 LBS

## 2023-05-25 DIAGNOSIS — J02.9 SORE THROAT: ICD-10-CM

## 2023-05-25 DIAGNOSIS — F41.1 GAD (GENERALIZED ANXIETY DISORDER): ICD-10-CM

## 2023-05-25 DIAGNOSIS — G25.0 ESSENTIAL TREMOR: Primary | ICD-10-CM

## 2023-05-25 PROBLEM — S92.352A CLOSED DISPLACED FRACTURE OF FIFTH METATARSAL BONE OF LEFT FOOT, INITIAL ENCOUNTER: Status: RESOLVED | Noted: 2021-05-05 | Resolved: 2023-05-25

## 2023-05-25 PROBLEM — S92.352D CLOSED DISPLACED FRACTURE OF FIFTH METATARSAL BONE OF LEFT FOOT WITH ROUTINE HEALING: Status: RESOLVED | Noted: 2021-05-03 | Resolved: 2023-05-25

## 2023-05-25 PROBLEM — S92.515A CLOSED NONDISPLACED FRACTURE OF PROXIMAL PHALANX OF LESSER TOE OF LEFT FOOT, INITIAL ENCOUNTER: Status: RESOLVED | Noted: 2021-09-27 | Resolved: 2023-05-25

## 2023-05-25 LAB
DEPRECATED S PYO AG THROAT QL EIA: NEGATIVE
GROUP A STREP BY PCR: NOT DETECTED

## 2023-05-25 PROCEDURE — 99214 OFFICE O/P EST MOD 30 MIN: CPT | Performed by: FAMILY MEDICINE

## 2023-05-25 PROCEDURE — 87651 STREP A DNA AMP PROBE: CPT | Performed by: FAMILY MEDICINE

## 2023-05-25 RX ORDER — PROPRANOLOL HYDROCHLORIDE 120 MG/1
120 CAPSULE, EXTENDED RELEASE ORAL DAILY
Qty: 30 CAPSULE | Refills: 3 | Status: SHIPPED | OUTPATIENT
Start: 2023-05-25 | End: 2023-10-13

## 2023-05-25 RX ORDER — FLUOXETINE 40 MG/1
40 CAPSULE ORAL DAILY
Qty: 90 CAPSULE | Refills: 1 | Status: SHIPPED | OUTPATIENT
Start: 2023-05-25 | End: 2023-12-19

## 2023-05-25 ASSESSMENT — ANXIETY QUESTIONNAIRES
7. FEELING AFRAID AS IF SOMETHING AWFUL MIGHT HAPPEN: NOT AT ALL
4. TROUBLE RELAXING: SEVERAL DAYS
2. NOT BEING ABLE TO STOP OR CONTROL WORRYING: NOT AT ALL
8. IF YOU CHECKED OFF ANY PROBLEMS, HOW DIFFICULT HAVE THESE MADE IT FOR YOU TO DO YOUR WORK, TAKE CARE OF THINGS AT HOME, OR GET ALONG WITH OTHER PEOPLE?: NOT DIFFICULT AT ALL
5. BEING SO RESTLESS THAT IT IS HARD TO SIT STILL: SEVERAL DAYS
GAD7 TOTAL SCORE: 4
GAD7 TOTAL SCORE: 4
7. FEELING AFRAID AS IF SOMETHING AWFUL MIGHT HAPPEN: NOT AT ALL
6. BECOMING EASILY ANNOYED OR IRRITABLE: SEVERAL DAYS
1. FEELING NERVOUS, ANXIOUS, OR ON EDGE: SEVERAL DAYS
3. WORRYING TOO MUCH ABOUT DIFFERENT THINGS: NOT AT ALL
IF YOU CHECKED OFF ANY PROBLEMS ON THIS QUESTIONNAIRE, HOW DIFFICULT HAVE THESE PROBLEMS MADE IT FOR YOU TO DO YOUR WORK, TAKE CARE OF THINGS AT HOME, OR GET ALONG WITH OTHER PEOPLE: NOT DIFFICULT AT ALL

## 2023-05-25 NOTE — ASSESSMENT & PLAN NOTE
The patient is experiencing increased anxiety due to emotional stress with her mom currently in hospice.  We talked about ways to manage her current stress level including slowing down as she is quite busy much of the time.  I increased her fluoxetine to 40 mg daily as well.

## 2023-05-25 NOTE — ASSESSMENT & PLAN NOTE
The patient reports that her tremor still bothers her although she does find the propranolol helpful, she often forgets to take the second dose.  I suggested we give a trial to the long-acting propranolol taken once a day in the morning and a prescription was provided for the 120 mg capsule.

## 2023-05-25 NOTE — PROGRESS NOTES
Assessment & Plan   Problem List Items Addressed This Visit        Nervous and Auditory    Essential tremor - Primary     The patient reports that her tremor still bothers her although she does find the propranolol helpful, she often forgets to take the second dose.  I suggested we give a trial to the long-acting propranolol taken once a day in the morning and a prescription was provided for the 120 mg capsule.         Relevant Medications    propranolol ER (INDERAL LA) 120 MG 24 hr capsule       Behavioral    JULIETA (generalized anxiety disorder)     The patient is experiencing increased anxiety due to emotional stress with her mom currently in hospice.  We talked about ways to manage her current stress level including slowing down as she is quite busy much of the time.  I increased her fluoxetine to 40 mg daily as well.         Relevant Medications    FLUoxetine (PROZAC) 40 MG capsule   Other Visit Diagnoses     Sore throat        Likely due to seasonal allergies; strep negative and recommended a trial of Zyrtec 10mg daily.     Relevant Orders    Streptococcus A Rapid Screen w/Reflex to PCR - Clinic Collect (Completed)    Group A Streptococcus PCR Throat Swab             MARY TAFOYA MD  [facility]    Criss Brandt is a 62 year old here today for routine medication check visit.  The patient reports that she has been a bit more emotional lately as her mom has been on hospice over the past 6 months with dementia.  She reports that she has been a bit more tearful and feeling at times that she is a little less focused as well.  She has a history of essential tremor and did not do well with primidone.  She does feel the propranolol helps a bit but she frequently forgets to take that second dose in the afternoon.  She is on fluoxetine 30 mg daily for her anxiety and wonders if increasing that dose a little would be helpful during the stressful time.  Lastly, the patient notes that  she has had a sore throat for the past week but has had about 3 weeks of postnasal drip and nasal drainage secondary to allergies.  She currently does not take anything for those allergies.    med check         View : No data to display.              History of Present Illness       Reason for visit:  Prescription Renewal    She eats 2-3 servings of fruits and vegetables daily.She consumes 0 sweetened beverage(s) daily.She exercises with enough effort to increase her heart rate 30 to 60 minutes per day.  She exercises with enough effort to increase her heart rate 4 days per week.   She is taking medications regularly.  Today's JULIETA-7 Score: 4           Objective    BP 99/68 (BP Location: Left arm, Patient Position: Left side, Cuff Size: Adult Regular)   Pulse 110   Wt 53.5 kg (118 lb)   LMP 04/01/2020 (Approximate)   SpO2 98%   BMI 20.25 kg/m    Body mass index is 20.25 kg/m .  Physical Exam   GENERAL: healthy, alert and no distress

## 2023-08-01 ENCOUNTER — TRANSFERRED RECORDS (OUTPATIENT)
Dept: HEALTH INFORMATION MANAGEMENT | Facility: CLINIC | Age: 63
End: 2023-08-01

## 2023-08-06 ENCOUNTER — HEALTH MAINTENANCE LETTER (OUTPATIENT)
Age: 63
End: 2023-08-06

## 2023-10-07 DIAGNOSIS — N95.2 ATROPHIC VAGINITIS: ICD-10-CM

## 2023-10-09 RX ORDER — ESTRADIOL 0.1 MG/G
CREAM VAGINAL
Qty: 42.5 G | Refills: 0 | Status: SHIPPED | OUTPATIENT
Start: 2023-10-09 | End: 2024-03-25

## 2023-10-13 ENCOUNTER — MYC MEDICAL ADVICE (OUTPATIENT)
Dept: FAMILY MEDICINE | Facility: CLINIC | Age: 63
End: 2023-10-13

## 2023-10-13 DIAGNOSIS — G25.0 ESSENTIAL TREMOR: ICD-10-CM

## 2023-10-15 RX ORDER — PROPRANOLOL HYDROCHLORIDE 120 MG/1
120 CAPSULE, EXTENDED RELEASE ORAL DAILY
Qty: 90 CAPSULE | Refills: 3 | Status: SHIPPED | OUTPATIENT
Start: 2023-10-15 | End: 2024-06-24

## 2023-11-07 ENCOUNTER — TRANSFERRED RECORDS (OUTPATIENT)
Dept: HEALTH INFORMATION MANAGEMENT | Facility: CLINIC | Age: 63
End: 2023-11-07

## 2023-12-19 DIAGNOSIS — F41.1 GAD (GENERALIZED ANXIETY DISORDER): ICD-10-CM

## 2023-12-19 RX ORDER — FLUOXETINE 40 MG/1
40 CAPSULE ORAL DAILY
Qty: 90 CAPSULE | Refills: 0 | Status: SHIPPED | OUTPATIENT
Start: 2023-12-19 | End: 2024-05-01

## 2024-02-20 ENCOUNTER — TRANSFERRED RECORDS (OUTPATIENT)
Dept: HEALTH INFORMATION MANAGEMENT | Facility: CLINIC | Age: 64
End: 2024-02-20

## 2024-03-25 DIAGNOSIS — N95.2 ATROPHIC VAGINITIS: ICD-10-CM

## 2024-03-25 RX ORDER — ESTRADIOL 0.1 MG/G
CREAM VAGINAL
Qty: 42.5 G | Refills: 2 | Status: SHIPPED | OUTPATIENT
Start: 2024-03-25

## 2024-04-02 ENCOUNTER — TRANSFERRED RECORDS (OUTPATIENT)
Dept: HEALTH INFORMATION MANAGEMENT | Facility: CLINIC | Age: 64
End: 2024-04-02

## 2024-04-30 DIAGNOSIS — F41.1 GAD (GENERALIZED ANXIETY DISORDER): ICD-10-CM

## 2024-05-01 RX ORDER — FLUOXETINE 40 MG/1
40 CAPSULE ORAL DAILY
Qty: 90 CAPSULE | Refills: 0 | Status: SHIPPED | OUTPATIENT
Start: 2024-05-01 | End: 2024-06-24

## 2024-06-24 ENCOUNTER — OFFICE VISIT (OUTPATIENT)
Dept: FAMILY MEDICINE | Facility: CLINIC | Age: 64
End: 2024-06-24

## 2024-06-24 VITALS
HEART RATE: 102 BPM | BODY MASS INDEX: 19.97 KG/M2 | TEMPERATURE: 98.6 F | HEIGHT: 64 IN | DIASTOLIC BLOOD PRESSURE: 59 MMHG | RESPIRATION RATE: 14 BRPM | WEIGHT: 117 LBS | OXYGEN SATURATION: 98 % | SYSTOLIC BLOOD PRESSURE: 90 MMHG

## 2024-06-24 DIAGNOSIS — G25.0 ESSENTIAL TREMOR: ICD-10-CM

## 2024-06-24 DIAGNOSIS — F41.1 GAD (GENERALIZED ANXIETY DISORDER): Primary | ICD-10-CM

## 2024-06-24 DIAGNOSIS — N95.2 ATROPHIC VAGINITIS: ICD-10-CM

## 2024-06-24 PROCEDURE — 99214 OFFICE O/P EST MOD 30 MIN: CPT | Performed by: FAMILY MEDICINE

## 2024-06-24 PROCEDURE — 96127 BRIEF EMOTIONAL/BEHAV ASSMT: CPT | Performed by: FAMILY MEDICINE

## 2024-06-24 RX ORDER — FLUOXETINE 40 MG/1
40 CAPSULE ORAL DAILY
Qty: 90 CAPSULE | Refills: 3 | Status: SHIPPED | OUTPATIENT
Start: 2024-06-24

## 2024-06-24 RX ORDER — PROPRANOLOL HYDROCHLORIDE 120 MG/1
120 CAPSULE, EXTENDED RELEASE ORAL DAILY
Qty: 90 CAPSULE | Refills: 3 | Status: SHIPPED | OUTPATIENT
Start: 2024-06-24

## 2024-06-24 ASSESSMENT — ANXIETY QUESTIONNAIRES
2. NOT BEING ABLE TO STOP OR CONTROL WORRYING: NOT AT ALL
1. FEELING NERVOUS, ANXIOUS, OR ON EDGE: SEVERAL DAYS
GAD7 TOTAL SCORE: 1
3. WORRYING TOO MUCH ABOUT DIFFERENT THINGS: NOT AT ALL
4. TROUBLE RELAXING: NOT AT ALL
IF YOU CHECKED OFF ANY PROBLEMS ON THIS QUESTIONNAIRE, HOW DIFFICULT HAVE THESE PROBLEMS MADE IT FOR YOU TO DO YOUR WORK, TAKE CARE OF THINGS AT HOME, OR GET ALONG WITH OTHER PEOPLE: NOT DIFFICULT AT ALL
7. FEELING AFRAID AS IF SOMETHING AWFUL MIGHT HAPPEN: NOT AT ALL
5. BEING SO RESTLESS THAT IT IS HARD TO SIT STILL: NOT AT ALL
GAD7 TOTAL SCORE: 1
7. FEELING AFRAID AS IF SOMETHING AWFUL MIGHT HAPPEN: NOT AT ALL
6. BECOMING EASILY ANNOYED OR IRRITABLE: NOT AT ALL
8. IF YOU CHECKED OFF ANY PROBLEMS, HOW DIFFICULT HAVE THESE MADE IT FOR YOU TO DO YOUR WORK, TAKE CARE OF THINGS AT HOME, OR GET ALONG WITH OTHER PEOPLE?: NOT DIFFICULT AT ALL

## 2024-06-24 NOTE — ASSESSMENT & PLAN NOTE
She uses estradiol cream twice weekly which works well to manage her symptoms of atrophic vaginitis.

## 2024-06-24 NOTE — PROGRESS NOTES
Assessment & Plan   Problem List Items Addressed This Visit       Essential tremor     The patient reports that her long-acting propranolol 120 mg daily is working well.         Relevant Medications    propranolol ER (INDERAL LA) 120 MG 24 hr capsule    JULIETA (generalized anxiety disorder) - Primary     Patient reports that she feels her current dose of fluoxetine 40 mg daily is working well.         Relevant Medications    FLUoxetine (PROZAC) 40 MG capsule    Atrophic vaginitis     She uses estradiol cream twice weekly which works well to manage her symptoms of atrophic vaginitis.           Counseled the patient regarding options as it relates to preventive care opportunities such as place that she can go to get a free mammogram as well as less expensive option such as a Cologuard.  She says she will think about that.      Criss Brandt is a 63 year old who presents today for a routine medication check visit.  The patient feels that her medication she is on are all working well and she would like to continue on them.  The patient does not currently have very good insurance and so declines all preventive care opportunities at this time.  This includes cancer screening and vaccinations.  She states that once she gets on Medicare then she would be open to considering it.        6/24/2024     9:48 AM   Additional Questions   Roomed by as   Accompanied by self         6/24/2024     9:48 AM   Patient Reported Additional Medications   Patient reports taking the following new medications no     History of Present Illness       Reason for visit:  Med refills    She eats 2-3 servings of fruits and vegetables daily.She consumes 0 sweetened beverage(s) daily.She exercises with enough effort to increase her heart rate 30 to 60 minutes per day.  She exercises with enough effort to increase her heart rate 6 days per week.   She is taking medications regularly.         Objective    BP 90/59 (BP Location: Left arm, Patient  "Position: Left side, Cuff Size: Adult Regular)   Pulse 102   Temp 98.6  F (37  C) (Oral)   Resp 14   Ht 1.626 m (5' 4\")   Wt 53.1 kg (117 lb)   LMP 04/01/2020 (Approximate)   SpO2 98%   BMI 20.08 kg/m    Body mass index is 20.08 kg/m .  Physical Exam   GENERAL: alert and no distress            Signed Electronically by: MARY TAFOYA MD    "

## 2024-07-09 ENCOUNTER — TRANSFERRED RECORDS (OUTPATIENT)
Dept: HEALTH INFORMATION MANAGEMENT | Facility: CLINIC | Age: 64
End: 2024-07-09

## 2024-09-29 ENCOUNTER — HEALTH MAINTENANCE LETTER (OUTPATIENT)
Age: 64
End: 2024-09-29

## 2024-10-14 ENCOUNTER — TRANSFERRED RECORDS (OUTPATIENT)
Dept: HEALTH INFORMATION MANAGEMENT | Facility: CLINIC | Age: 64
End: 2024-10-14

## 2024-11-08 ENCOUNTER — OFFICE VISIT (OUTPATIENT)
Dept: FAMILY MEDICINE | Facility: CLINIC | Age: 64
End: 2024-11-08

## 2024-11-08 VITALS
SYSTOLIC BLOOD PRESSURE: 91 MMHG | DIASTOLIC BLOOD PRESSURE: 62 MMHG | BODY MASS INDEX: 19.97 KG/M2 | TEMPERATURE: 98.4 F | OXYGEN SATURATION: 95 % | HEART RATE: 100 BPM | RESPIRATION RATE: 12 BRPM | HEIGHT: 64 IN | WEIGHT: 117 LBS

## 2024-11-08 DIAGNOSIS — M18.12 PRIMARY OSTEOARTHRITIS OF FIRST CARPOMETACARPAL JOINT OF LEFT HAND: ICD-10-CM

## 2024-11-08 DIAGNOSIS — Z01.818 PREOP GENERAL PHYSICAL EXAM: Primary | ICD-10-CM

## 2024-11-08 PROBLEM — M79.642 PAIN OF LEFT HAND: Status: ACTIVE | Noted: 2024-11-08

## 2024-11-08 PROBLEM — M79.642 PAIN OF LEFT HAND: Status: RESOLVED | Noted: 2024-11-08 | Resolved: 2024-11-08

## 2024-11-08 LAB
ANION GAP SERPL CALCULATED.3IONS-SCNC: 10 MMOL/L (ref 7–15)
BUN SERPL-MCNC: 18.9 MG/DL (ref 8–23)
CALCIUM SERPL-MCNC: 9.4 MG/DL (ref 8.8–10.4)
CHLORIDE SERPL-SCNC: 104 MMOL/L (ref 98–107)
CREAT SERPL-MCNC: 0.59 MG/DL (ref 0.51–0.95)
EGFRCR SERPLBLD CKD-EPI 2021: >90 ML/MIN/1.73M2
GLUCOSE SERPL-MCNC: 91 MG/DL (ref 70–99)
HCO3 SERPL-SCNC: 24 MMOL/L (ref 22–29)
HGB BLD-MCNC: 12.3 G/DL (ref 11.7–15.7)
POTASSIUM SERPL-SCNC: 4.4 MMOL/L (ref 3.4–5.3)
SODIUM SERPL-SCNC: 138 MMOL/L (ref 135–145)

## 2024-11-08 PROCEDURE — 80048 BASIC METABOLIC PNL TOTAL CA: CPT | Performed by: FAMILY MEDICINE

## 2024-11-08 PROCEDURE — 85018 HEMOGLOBIN: CPT | Performed by: FAMILY MEDICINE

## 2024-11-08 PROCEDURE — 36415 COLL VENOUS BLD VENIPUNCTURE: CPT | Performed by: FAMILY MEDICINE

## 2024-11-08 PROCEDURE — 99214 OFFICE O/P EST MOD 30 MIN: CPT | Performed by: FAMILY MEDICINE

## 2024-11-08 NOTE — PROGRESS NOTES
Preoperative Evaluation  [place] [id number]  [facility] [address]  Phone: [phone]  Fax: [phone]  Primary Provider: MARY TAFOYA MD  Pre-op Performing Provider: Jacob Méndez DO  Nov 8, 2024 11/5/2024   Surgical Information   What procedure is being done? Left hand surgery    Facility or Hospital where procedure/surgery will be performed: [facility]    Who is doing the procedure / surgery? Dr. Uriel Rueda    Date of surgery / procedure: 11/25/24    Time of surgery / procedure: Left thumb trapezium excision with LRTI    Where do you plan to recover after surgery? at home with family        Patient-reported     Fax number for surgical facility: [phone]    Assessment & Plan     The proposed surgical procedure is considered INTERMEDIATE risk.    Problem List Items Addressed This Visit    None  Visit Diagnoses       Preop general physical exam    -  Primary    Relevant Orders    Hemoglobin (Completed)    Basic metabolic panel  (Ca, Cl, CO2, Creat, Gluc, K, Na, BUN)    Primary osteoarthritis of first carpometacarpal joint of left hand        Relevant Orders    Hemoglobin (Completed)    Basic metabolic panel  (Ca, Cl, CO2, Creat, Gluc, K, Na, BUN)                    - No identified additional risk factors other than previously addressed    Preoperative Medication Instructions  Antiplatelet or Anticoagulation Medication Instructions   - Patient is on no antiplatelet or anticoagulation medications.    Additional Medication Instructions  Take all medications the night before surgery as usual.    Recommendation  Approval given to proceed with proposed procedure, without further diagnostic evaluation.    Criss Brandt is a 64 year old, presenting for the following:  Pre-Op Exam (11/25/24; [facility], [place], [place]; Fax: [phone]; Dr. Uriel Rueda; LEFT THUMB TRAPEZIUM EXCISION WITH  LRTI)          11/8/2024    10:29 AM   Additional Questions   Roomed by CHOCO Fontaine   Accompanied by Self         11/8/2024    10:29 AM   Patient Reported Additional Medications   Patient reports taking the following new medications N/A     HPI related to upcoming procedure: Patient was sick significant osteoarthritis of bilateral basilar thumbs.  Left worse than right.  Right hand dominant.  Uses braces and has had multiple injections with dwindling benefit thus the decision for the planned procedure.    Denies any chest pain, shortness of breath, dyspnea exertion, palpitations, nausea or vomiting.  Denies any changes in vision or hearing, or urinary or bowel habits.          11/5/2024   Pre-Op Questionnaire   Have you ever had a heart attack or stroke? No    Have you ever had surgery on your heart or blood vessels, such as a stent placement, a coronary artery bypass, or surgery on an artery in your head, neck, heart, or legs? No    Do you have chest pain with activity? No    Do you have a history of heart failure? No    Do you currently have a cold, bronchitis or symptoms of other infection? No    Do you have a cough, shortness of breath, or wheezing? No    Do you or anyone in your family have previous history of blood clots? No    Do you or does anyone in your family have a serious bleeding problem such as prolonged bleeding following surgeries or cuts? No    Have you ever had problems with anemia or been told to take iron pills? No    Have you had any abnormal blood loss such as black, tarry or bloody stools, or abnormal vaginal bleeding? No    Have you ever had a blood transfusion? No    Are you willing to have a blood transfusion if it is medically needed before, during, or after your surgery? Yes    Have you or any of your relatives ever had problems with anesthesia? No    Do you have sleep apnea, excessive snoring or daytime drowsiness? No    Do you have any artifical heart valves or other implanted medical  devices like a pacemaker, defibrillator, or continuous glucose monitor? No    Do you have artificial joints? No    Are you allergic to latex? No        Patient-reported     Health Care Directive  Patient does not have a Health Care Directive: Discussed advance care planning with patient; however, patient declined at this time.    Preoperative Review of    reviewed - no record of controlled substances prescribed.      Patient Active Problem List    Diagnosis Date Noted    Pain in joint, ankle and foot, left 05/03/2021     Priority: Medium    Atrophic vaginitis 06/23/2020     Priority: Medium    JULIETA (generalized anxiety disorder) 03/14/2019     Priority: Medium    Essential tremor      Priority: Medium     Created by Conversion          Past Medical History:   Diagnosis Date    Arthritis Fall 2021    Caused by repetitive hard pressing while cleaning with both hands    Depressive disorder under duress    anxiety more than depression    Fibrocystic breast     Hereditary essential tremor     left upper extremity     Past Surgical History:   Procedure Laterality Date    OPEN REDUCTION INTERNAL FIXATION FOOT Left 5/6/2021    Procedure: OPEN REDUCTION INTERNAL FIXATION, fifth metatarsal left foot;  Surgeon: Raffaele Cortes DPM;  Location: [facility];  Service: Podiatry    TUBAL LIGATION      [facility] LIGATE FALLOPIAN TUBE      Description: Tubal Ligation;  Recorded: 09/04/2014;     Current Outpatient Medications   Medication Sig Dispense Refill    estradiol (ESTRACE) 0.1 MG/GM vaginal cream INSERT 0.5 TO 1 GRAM INTRAVAGINALLY TWICE WEEKLY. 42.5 g 2    FLUoxetine (PROZAC) 40 MG capsule Take 1 capsule (40 mg) by mouth daily 90 capsule 3    propranolol ER (INDERAL LA) 120 MG 24 hr capsule Take 1 capsule (120 mg) by mouth daily 90 capsule 3       Allergies   Allergen Reactions    Compazine [Prochlorperazine] Other (See Comments)     Per patient frozen face         Social History     Tobacco Use    Smoking status:  "Former     Current packs/day: 0.00     Average packs/day: 0.5 packs/day for 1 year (0.5 ttl pk-yrs)     Types: Cigarettes     Start date:      Quit date:      Years since quittin.8    Smokeless tobacco: Never    Tobacco comments:     Quit 42 yrs ago   Substance Use Topics    Alcohol use: No     History   Drug Use No           Objective    BP 91/62 (BP Location: Left arm, Patient Position: Sitting, Cuff Size: Adult Large)   Pulse 100   Temp 98.4  F (36.9  C) (Oral)   Resp 12   Ht 1.619 m (5' 3.75\")   Wt 53.1 kg (117 lb)   LMP 2020 (Approximate)   SpO2 95%   Breastfeeding No   BMI 20.24 kg/m     Estimated body mass index is 20.24 kg/m  as calculated from the following:    Height as of this encounter: 1.619 m (5' 3.75\").    Weight as of this encounter: 53.1 kg (117 lb).  Physical Exam  Vitals and nursing note reviewed.   Constitutional:       General: She is not in acute distress.     Appearance: Normal appearance.   HENT:      Head: Normocephalic and atraumatic.      Right Ear: Tympanic membrane, ear canal and external ear normal.      Left Ear: Tympanic membrane, ear canal and external ear normal.      Nose: Nose normal.      Mouth/Throat:      Mouth: Mucous membranes are moist.      Pharynx: Oropharynx is clear. No oropharyngeal exudate.   Eyes:      General: No scleral icterus.     Extraocular Movements: Extraocular movements intact.      Conjunctiva/sclera: Conjunctivae normal.   Neck:      Vascular: No carotid bruit.   Cardiovascular:      Rate and Rhythm: Normal rate and regular rhythm.      Pulses: Normal pulses.      Heart sounds: Normal heart sounds. No murmur heard.     No friction rub. No gallop.   Pulmonary:      Effort: Pulmonary effort is normal.      Breath sounds: Normal breath sounds. No wheezing, rhonchi or rales.   Musculoskeletal:         General: No swelling. Normal range of motion.      Cervical back: Normal range of motion.      Right lower leg: No edema.      Left " "lower leg: No edema.   Skin:     General: Skin is warm and dry.      Capillary Refill: Capillary refill takes less than 2 seconds.      Findings: No rash.   Neurological:      General: No focal deficit present.      Mental Status: She is alert and oriented to person, place, and time.      Cranial Nerves: No cranial nerve deficit.      Gait: Gait is intact. Gait normal.      Deep Tendon Reflexes: Reflexes normal.      Reflex Scores:       Bicep reflexes are 2+ on the right side and 2+ on the left side.       Patellar reflexes are 2+ on the right side and 2+ on the left side.  Psychiatric:         Mood and Affect: Mood normal.         Thought Content: Thought content normal.       No results for input(s): \"HGB\", \"PLT\", \"INR\", \"NA\", \"POTASSIUM\", \"CR\", \"A1C\" in the last 8760 hours.     Diagnostics  Recent Results (from the past week)   Hemoglobin    Collection Time: 11/08/24 11:13 AM   Result Value Ref Range    Hemoglobin 12.3 11.7 - 15.7 g/dL   Basic metabolic panel  (Ca, Cl, CO2, Creat, Gluc, K, Na, BUN)    Collection Time: 11/08/24 11:13 AM   Result Value Ref Range    Sodium 138 135 - 145 mmol/L    Potassium 4.4 3.4 - 5.3 mmol/L    Chloride 104 98 - 107 mmol/L    Carbon Dioxide (CO2) 24 22 - 29 mmol/L    Anion Gap 10 7 - 15 mmol/L    Urea Nitrogen 18.9 8.0 - 23.0 mg/dL    Creatinine 0.59 0.51 - 0.95 mg/dL    GFR Estimate >90 >60 mL/min/1.73m2    Calcium 9.4 8.8 - 10.4 mg/dL    Glucose 91 70 - 99 mg/dL      No EKG required for low risk surgery (cataract, skin procedure, breast biopsy, etc).  No EKG required, no history of coronary heart disease, significant arrhythmia, peripheral arterial disease or other structural heart disease.    Revised Cardiac Risk Index (RCRI)  The patient has the following serious cardiovascular risks for perioperative complications:   - No serious cardiac risks = 0 points     RCRI Interpretation: 0 points: Class I (very low risk - 0.4% complication rate)         Signed Electronically by: " Jacob Méndez, DO  A copy of this evaluation report is provided to the requesting physician.

## 2024-11-08 NOTE — PATIENT INSTRUCTIONS
How to Take Your Medication Before Surgery  Preoperative Medication Instructions   Antiplatelet or Anticoagulation Medication Instructions   - Patient is on no antiplatelet or anticoagulation medications.    Additional Medication Instructions  Take all medications the night before surgery as usual.       Patient Education   Preparing for Your Surgery  For Adults  Getting started  In most cases, a nurse will call to review your health history and instructions. They will give you an arrival time based on your scheduled surgery time. Please be ready to share:  Your doctor's clinic name and phone number  Your medical, surgical, and anesthesia history  A list of allergies and sensitivities  A list of medicines, including herbal treatments and over-the-counter drugs  Whether the patient has a legal guardian (ask how to send us the papers in advance)  Note: You may not receive a call if you were seen at our PAC (Preoperative Assessment Center).  Please tell us if you're pregnant--or if there's any chance you might be pregnant. Some surgeries may injure a fetus (unborn baby), so they require a pregnancy test. Surgeries that are safe for a fetus don't always need a test, and you can choose whether to have one.   Preparing for surgery  Within 10 to 30 days of surgery: Have a pre-op exam (sometimes called an H&P, or History and Physical). This can be done at a clinic or pre-operative center.  If you're having a , you may not need this exam. Talk to your care team.  At your pre-op exam, talk to your care team about all medicines you take. (This includes CBD oil and any drugs, such as THC, marijuana, and other forms of cannabis.) If you need to stop any medicine before surgery, ask when to start taking it again.  This is for your safety. Many medicines and drugs can make you bleed too much during surgery. Some change how well surgery (anesthesia) drugs work.  Call your insurance company to let them know you're having  surgery. (If you don't have insurance, call [phone].)  Call your clinic if there's any change in your health. This includes a scrape or scratch near the surgery site, or any signs of a cold (sore throat, runny nose, cough, rash, fever).  Eating and drinking guidelines  For your safety: Unless your surgeon tells you otherwise, follow the guidelines below.  Eat and drink as normal until 8 hours before you arrive for surgery. After that, no food or milk. You can spit out gum when you arrive.  Drink clear liquids until 2 hours before you arrive. These are liquids you can see through, like water, Gatorade, and Propel Water. They also include plain black coffee and tea (no cream or milk).  No alcohol for 24 hours before you arrive. The night before surgery, stop any drinks that contain THC.  If your care team tells you to take medicine on the morning of surgery, it's okay to take it with a sip of water. No other medicines or drugs are allowed (including CBD oil)--follow your care team's instructions.  If you have questions the day of surgery, call your hospital or surgery center.   Preventing infection  Shower or bathe the night before and the morning of surgery. Follow the instructions your clinic gave you. (If no instructions, use regular soap.)  Don't shave or clip hair near your surgery site. We'll remove the hair if needed.  Don't smoke or vape the morning of surgery. No chewing tobacco for 6 hours before you arrive. A nicotine patch is okay. You may spit out nicotine gum when you arrive.  For some surgeries, the surgeon will tell you to fully quit smoking and nicotine.  We will make every effort to keep you safe from infection. We will:  Clean our hands often with soap and water (or an alcohol-based hand rub).  Clean the skin at your surgery site with a special soap that kills germs.  Give you a special gown to keep you warm. (Cold raises the risk of infection.)  Wear hair covers, masks, gowns, and gloves  during surgery.  Give antibiotic medicine, if prescribed. Not all surgeries need this medicine.  What to bring on the day of surgery  Photo ID and insurance card  Copy of your health care directive, if you have one  Glasses and hearing aids (bring cases)  You can't wear contacts during surgery  Inhaler and eye drops, if you use them (tell us about these when you arrive)  CPAP machine or breathing device, if you use them  A few personal items, if spending the night  If you have . . .  A pacemaker, ICD (cardiac defibrillator), or other implant: Bring the ID card.  An implanted stimulator: Bring the remote control.  A legal guardian: Bring a copy of the certified (court-stamped) guardianship papers.  Please remove any jewelry, including body piercings. Leave jewelry and other valuables at home.  If you're going home the day of surgery  You must have a responsible adult drive you home. They should stay with you overnight as well.  If you don't have someone to stay with you, and you aren't safe to go home alone, we may keep you overnight. Insurance often won't pay for this.  After surgery  If it's hard to control your pain or you need more pain medicine, please call your surgeon's office.  Questions?   If you have any questions for your care team, list them here:   ____________________________________________________________________________________________________________________________________________________________________________________________________________________________________________________________  For informational purposes only. Not to replace the advice of your health care provider. Copyright   2003, 2019 [facility]. All rights reserved. Clinically reviewed by Da Masterson MD. [employer] [id number] - REV 08/24.

## 2024-12-03 ENCOUNTER — MYC MEDICAL ADVICE (OUTPATIENT)
Dept: FAMILY MEDICINE | Facility: CLINIC | Age: 64
End: 2024-12-03

## 2024-12-03 DIAGNOSIS — G25.0 ESSENTIAL TREMOR: Primary | ICD-10-CM

## 2024-12-04 RX ORDER — PROPRANOLOL HYDROCHLORIDE 60 MG/1
60 CAPSULE, EXTENDED RELEASE ORAL DAILY
Qty: 30 CAPSULE | Refills: 0 | Status: SHIPPED | OUTPATIENT
Start: 2024-12-04

## 2024-12-19 ENCOUNTER — TRANSFERRED RECORDS (OUTPATIENT)
Dept: HEALTH INFORMATION MANAGEMENT | Facility: CLINIC | Age: 64
End: 2024-12-19

## 2025-01-16 ENCOUNTER — TRANSFERRED RECORDS (OUTPATIENT)
Dept: HEALTH INFORMATION MANAGEMENT | Facility: CLINIC | Age: 65
End: 2025-01-16

## 2025-01-28 ENCOUNTER — MYC MEDICAL ADVICE (OUTPATIENT)
Dept: FAMILY MEDICINE | Facility: CLINIC | Age: 65
End: 2025-01-28

## 2025-01-30 NOTE — TELEPHONE ENCOUNTER
Notes printed. Awaiting response from patient regarding surgery location. Will call and fax when patient responds.

## 2025-01-30 NOTE — TELEPHONE ENCOUNTER
Verified with patient. No change in health status or medications since the last surgery. Will continue with same medication instructions as previous which is to take all scheduled medications the night prior to surgery (patient does not take any medications in the morning.) and patient is not on any anticoagulation medication.    No change in health or recommendations since previous pre-op evaluation on 11/8/2024. Approval given to proceed with the proposed procedure without further diagnostic evaluation.     Respectfully,  Armen Méndez DO

## 2025-02-02 ENCOUNTER — MYC MEDICAL ADVICE (OUTPATIENT)
Dept: FAMILY MEDICINE | Facility: CLINIC | Age: 65
End: 2025-02-02

## 2025-02-02 DIAGNOSIS — G25.0 ESSENTIAL TREMOR: ICD-10-CM

## 2025-02-03 RX ORDER — PROPRANOLOL HYDROCHLORIDE 60 MG/1
60 CAPSULE, EXTENDED RELEASE ORAL DAILY
Qty: 90 CAPSULE | Refills: 0 | Status: SHIPPED | OUTPATIENT
Start: 2025-02-03

## 2025-02-11 PROBLEM — M79.644 THUMB PAIN, RIGHT: Status: ACTIVE | Noted: 2025-02-11

## 2025-02-11 NOTE — ASSESSMENT & PLAN NOTE
She is undergoing right thumb trapezium excision and LRTI.  She previously had left thumb surgery in November 2024 and went very well.  She has elected to proceed with right thumb surgery at this time.   No labs indicated per surgeon.  No ECG indicated.

## 2025-02-18 ENCOUNTER — OFFICE VISIT (OUTPATIENT)
Dept: FAMILY MEDICINE | Facility: CLINIC | Age: 65
End: 2025-02-18

## 2025-02-18 VITALS
SYSTOLIC BLOOD PRESSURE: 106 MMHG | WEIGHT: 120.3 LBS | HEIGHT: 64 IN | RESPIRATION RATE: 16 BRPM | OXYGEN SATURATION: 97 % | TEMPERATURE: 98.8 F | BODY MASS INDEX: 20.54 KG/M2 | HEART RATE: 105 BPM | DIASTOLIC BLOOD PRESSURE: 65 MMHG

## 2025-02-18 DIAGNOSIS — Z01.818 PREOP GENERAL PHYSICAL EXAM: Primary | ICD-10-CM

## 2025-02-18 DIAGNOSIS — F41.1 GAD (GENERALIZED ANXIETY DISORDER): ICD-10-CM

## 2025-02-18 DIAGNOSIS — M79.644 THUMB PAIN, RIGHT: ICD-10-CM

## 2025-02-18 PROCEDURE — 99213 OFFICE O/P EST LOW 20 MIN: CPT | Performed by: PHYSICIAN ASSISTANT

## 2025-02-18 RX ORDER — HYDROXYZINE HYDROCHLORIDE 25 MG/1
TABLET, FILM COATED ORAL
COMMUNITY
Start: 2024-11-25 | End: 2025-02-18

## 2025-02-18 NOTE — PATIENT INSTRUCTIONS

## 2025-02-18 NOTE — PROGRESS NOTES
Preoperative Evaluation  [place] [id number]  [facility] [address]  Phone: [phone]  Fax: [phone]  Primary Provider: MARY TAFOYA MD  Pre-op Performing Provider: Clotilde Augustin PA-C  Feb 18, 2025 2/13/2025   Surgical Information   What procedure is being done? Right thumb trapezium excision with LRTI   Facility or Hospital where procedure/surgery will be performed: [facility]   Who is doing the procedure / surgery? Dr. Uriel Rueda   Date of surgery / procedure: 3/10/25   Time of surgery / procedure: N/A   Where do you plan to recover after surgery? at home with family     Fax number for surgical facility: [phone]    Assessment & Plan     The proposed surgical procedure is considered INTERMEDIATE risk.    Problem List Items Addressed This Visit       JULIETA (generalized anxiety disorder)     Currently managed with fluoxetine 40 mg daily.         Thumb pain, right     She is undergoing right thumb trapezium excision and LRTI.  She previously had left thumb surgery in November 2024 and went very well.  She has elected to proceed with right thumb surgery at this time.   No labs indicated per surgeon.  No ECG indicated.          Other Visit Diagnoses       Preop general physical exam    -  Primary           - No identified additional risk factors other than previously addressed    Preoperative Medication Instructions  Antiplatelet or Anticoagulation Medication Instructions   - We reviewed the medication list and the patient is not on an antiplatelet or anticoagulation medications.    Additional Medication Instructions  Take all scheduled medications on the day of surgery    Recommendation  Approval given to proceed with proposed procedure, without further diagnostic evaluation.    Criss Brandt is a 64 year old, presenting for the following:  Pre-Op Exam          2/18/2025    10:43 AM   Additional Questions   Roomed by  Duane Eugene MA   Accompanied by Self         2/18/2025    10:43 AM   Patient Reported Additional Medications   Patient reports taking the following new medications None     HPI related to upcoming procedure:   She is undergoing right thumb trapezium excision and LRTI.  She previously had left thumb surgery in November 2024 and went very well.  She has elected to proceed with right thumb surgery at this time.   No labs indicated per surgeon.  No ECG indicated.        2/13/2025   Pre-Op Questionnaire   Have you ever had a heart attack or stroke? No   Have you ever had surgery on your heart or blood vessels, such as a stent placement, a coronary artery bypass, or surgery on an artery in your head, neck, heart, or legs? No   Do you have chest pain with activity? No   Do you have a history of heart failure? No   Do you currently have a cold, bronchitis or symptoms of other infection? No   Do you have a cough, shortness of breath, or wheezing? No   Do you or anyone in your family have previous history of blood clots? No   Do you or does anyone in your family have a serious bleeding problem such as prolonged bleeding following surgeries or cuts? No   Have you ever had problems with anemia or been told to take iron pills? No   Have you had any abnormal blood loss such as black, tarry or bloody stools, or abnormal vaginal bleeding? No   Have you ever had a blood transfusion? No   Are you willing to have a blood transfusion if it is medically needed before, during, or after your surgery? Yes   Have you or any of your relatives ever had problems with anesthesia? No   Do you have sleep apnea, excessive snoring or daytime drowsiness? No   Do you have any artifical heart valves or other implanted medical devices like a pacemaker, defibrillator, or continuous glucose monitor? No   Do you have artificial joints? No   Are you allergic to latex? No     Health Care Directive  Patient does not have a Health Care Directive: Discussed  advance care planning with patient; however, patient declined at this time.    Preoperative Review of    reviewed - no record of controlled substances prescribed.  {      Patient Active Problem List    Diagnosis Date Noted    Thumb pain, right 02/11/2025     Priority: Medium    Pain in joint, ankle and foot, left 05/03/2021     Priority: Medium    Atrophic vaginitis 06/23/2020     Priority: Medium    JULIETA (generalized anxiety disorder) 03/14/2019     Priority: Medium    Essential tremor      Priority: Medium     Created by Conversion          Past Medical History:   Diagnosis Date    Arthritis Fall 2021    Caused by repetitive hard pressing while cleaning with both hands    Depressive disorder under duress    anxiety more than depression    Fibrocystic breast     Hereditary essential tremor     left upper extremity     Past Surgical History:   Procedure Laterality Date    OPEN REDUCTION INTERNAL FIXATION FOOT Left 5/6/2021    Procedure: OPEN REDUCTION INTERNAL FIXATION, fifth metatarsal left foot;  Surgeon: Raffaele Cortes DPM;  Location: [facility];  Service: Podiatry    TUBAL LIGATION      ZZC LIGATE FALLOPIAN TUBE      Description: Tubal Ligation;  Recorded: 09/04/2014;     Current Outpatient Medications   Medication Sig Dispense Refill    estradiol (ESTRACE) 0.1 MG/GM vaginal cream INSERT 0.5 TO 1 GRAM INTRAVAGINALLY TWICE WEEKLY. 42.5 g 2    FLUoxetine (PROZAC) 40 MG capsule Take 1 capsule (40 mg) by mouth daily 90 capsule 3    propranolol ER (INDERAL LA) 60 MG 24 hr capsule Take 1 capsule (60 mg) by mouth daily. 90 capsule 0    diclofenac (VOLTAREN) 50 MG EC tablet TAKE 1 TABLET BY MOUTH TWICE A DAY AS NEEDED FOR PAIN. TAKE WITH FOOD* (Patient not taking: Reported on 2/18/2025)         Allergies   Allergen Reactions    Compazine [Prochlorperazine] Other (See Comments)     Per patient frozen face         Social History     Tobacco Use    Smoking status: Former     Current packs/day: 0.00      "Average packs/day: 0.5 packs/day for 1 year (0.5 ttl pk-yrs)     Types: Cigarettes     Start date:      Quit date:      Years since quittin.1    Smokeless tobacco: Never    Tobacco comments:     Quit 42 yrs ago   Substance Use Topics    Alcohol use: No       History   Drug Use No               Objective    /65 (BP Location: Left arm, Patient Position: Sitting, Cuff Size: Adult Regular)   Pulse 105   Temp 98.8  F (37.1  C) (Oral)   Resp 16   Ht 1.619 m (5' 3.75\")   Wt 54.6 kg (120 lb 4.8 oz)   LMP 2020 (Approximate)   SpO2 97%   BMI 20.81 kg/m     Estimated body mass index is 20.81 kg/m  as calculated from the following:    Height as of this encounter: 1.619 m (5' 3.75\").    Weight as of this encounter: 54.6 kg (120 lb 4.8 oz).  Physical Exam  Vitals and nursing note reviewed.   Constitutional:       Appearance: Normal appearance. She is normal weight.   HENT:      Head: Normocephalic and atraumatic.      Right Ear: Tympanic membrane, ear canal and external ear normal.      Left Ear: Tympanic membrane, ear canal and external ear normal.      Mouth/Throat:      Mouth: Mucous membranes are moist.      Pharynx: Oropharynx is clear.   Eyes:      Conjunctiva/sclera: Conjunctivae normal.   Cardiovascular:      Rate and Rhythm: Normal rate and regular rhythm.      Pulses: Normal pulses.      Heart sounds: Normal heart sounds.   Pulmonary:      Effort: Pulmonary effort is normal.      Breath sounds: Normal breath sounds.   Musculoskeletal:      Cervical back: Normal range of motion and neck supple.   Skin:     General: Skin is warm and dry.   Neurological:      General: No focal deficit present.      Mental Status: She is alert and oriented to person, place, and time.   Psychiatric:         Mood and Affect: Mood normal.         Behavior: Behavior normal.         Thought Content: Thought content normal.         Judgment: Judgment normal.           Recent Labs   Lab Test 24  1113   HGB " 12.3      POTASSIUM 4.4   CR 0.59        Diagnostics  No labs were ordered during this visit.   Surgeon states labs do not be repeated.    No EKG required, no history of coronary heart disease, significant arrhythmia, peripheral arterial disease or other structural heart disease.    Revised Cardiac Risk Index (RCRI)  The patient has the following serious cardiovascular risks for perioperative complications:   - No serious cardiac risks = 0 points     RCRI Interpretation: 0 points: Class I (very low risk - 0.4% complication rate)         Signed Electronically by: Clotilde Augustin PA-C  A copy of this evaluation report is provided to the requesting physician.

## 2025-02-27 ENCOUNTER — TRANSFERRED RECORDS (OUTPATIENT)
Dept: HEALTH INFORMATION MANAGEMENT | Facility: CLINIC | Age: 65
End: 2025-02-27

## 2025-03-18 ENCOUNTER — TRANSFERRED RECORDS (OUTPATIENT)
Dept: HEALTH INFORMATION MANAGEMENT | Facility: CLINIC | Age: 65
End: 2025-03-18

## 2025-04-05 ENCOUNTER — MYC MEDICAL ADVICE (OUTPATIENT)
Dept: FAMILY MEDICINE | Facility: CLINIC | Age: 65
End: 2025-04-05

## 2025-04-05 DIAGNOSIS — G25.0 ESSENTIAL TREMOR: ICD-10-CM

## 2025-04-07 RX ORDER — PROPRANOLOL HYDROCHLORIDE 120 MG/1
120 CAPSULE, EXTENDED RELEASE ORAL DAILY
Qty: 90 CAPSULE | Refills: 1 | Status: SHIPPED | OUTPATIENT
Start: 2025-04-07

## 2025-04-15 ENCOUNTER — TRANSFERRED RECORDS (OUTPATIENT)
Dept: HEALTH INFORMATION MANAGEMENT | Facility: CLINIC | Age: 65
End: 2025-04-15

## 2025-05-14 ENCOUNTER — TRANSFERRED RECORDS (OUTPATIENT)
Dept: HEALTH INFORMATION MANAGEMENT | Facility: CLINIC | Age: 65
End: 2025-05-14

## 2025-06-11 ENCOUNTER — TRANSFERRED RECORDS (OUTPATIENT)
Dept: HEALTH INFORMATION MANAGEMENT | Facility: CLINIC | Age: 65
End: 2025-06-11

## 2025-06-26 DIAGNOSIS — N95.2 ATROPHIC VAGINITIS: ICD-10-CM

## 2025-06-26 RX ORDER — ESTRADIOL 0.1 MG/G
CREAM VAGINAL
Qty: 42.5 G | Refills: 0 | Status: SHIPPED | OUTPATIENT
Start: 2025-06-26

## 2025-06-28 ENCOUNTER — RESULTS FOLLOW-UP (OUTPATIENT)
Dept: URGENT CARE | Facility: CLINIC | Age: 65
End: 2025-06-28

## 2025-06-28 ENCOUNTER — LAB (OUTPATIENT)
Dept: LAB | Facility: CLINIC | Age: 65
End: 2025-06-28

## 2025-06-28 DIAGNOSIS — R30.0 DYSURIA: ICD-10-CM

## 2025-06-28 LAB
ALBUMIN UR-MCNC: NEGATIVE MG/DL
APPEARANCE UR: CLEAR
BILIRUB UR QL STRIP: NEGATIVE
COLOR UR AUTO: YELLOW
GLUCOSE UR STRIP-MCNC: NEGATIVE MG/DL
HGB UR QL STRIP: NEGATIVE
KETONES UR STRIP-MCNC: NEGATIVE MG/DL
LEUKOCYTE ESTERASE UR QL STRIP: NEGATIVE
NITRATE UR QL: NEGATIVE
PH UR STRIP: 7 [PH] (ref 5–8)
SP GR UR STRIP: 1.01 (ref 1–1.03)
UROBILINOGEN UR STRIP-ACNC: 0.2 E.U./DL

## 2025-06-28 PROCEDURE — 81003 URINALYSIS AUTO W/O SCOPE: CPT

## 2025-08-30 SDOH — HEALTH STABILITY: PHYSICAL HEALTH: ON AVERAGE, HOW MANY DAYS PER WEEK DO YOU ENGAGE IN MODERATE TO STRENUOUS EXERCISE (LIKE A BRISK WALK)?: 6 DAYS

## 2025-08-30 SDOH — HEALTH STABILITY: PHYSICAL HEALTH: ON AVERAGE, HOW MANY MINUTES DO YOU ENGAGE IN EXERCISE AT THIS LEVEL?: 40 MIN

## 2025-09-03 ENCOUNTER — OFFICE VISIT (OUTPATIENT)
Dept: FAMILY MEDICINE | Facility: CLINIC | Age: 65
End: 2025-09-03
Payer: COMMERCIAL

## 2025-09-03 VITALS
DIASTOLIC BLOOD PRESSURE: 78 MMHG | RESPIRATION RATE: 14 BRPM | HEIGHT: 64 IN | OXYGEN SATURATION: 96 % | TEMPERATURE: 98.2 F | WEIGHT: 121.5 LBS | HEART RATE: 89 BPM | SYSTOLIC BLOOD PRESSURE: 114 MMHG | BODY MASS INDEX: 20.74 KG/M2

## 2025-09-03 DIAGNOSIS — F41.1 GAD (GENERALIZED ANXIETY DISORDER): ICD-10-CM

## 2025-09-03 DIAGNOSIS — N95.2 ATROPHIC VAGINITIS: ICD-10-CM

## 2025-09-03 DIAGNOSIS — G25.0 ESSENTIAL TREMOR: ICD-10-CM

## 2025-09-03 DIAGNOSIS — Z12.4 CERVICAL CANCER SCREENING: ICD-10-CM

## 2025-09-03 DIAGNOSIS — Z12.11 SCREEN FOR COLON CANCER: ICD-10-CM

## 2025-09-03 DIAGNOSIS — Z00.00 ENCOUNTER FOR MEDICARE ANNUAL WELLNESS EXAM: Primary | ICD-10-CM

## 2025-09-03 DIAGNOSIS — Z78.0 POSTMENOPAUSAL STATUS: ICD-10-CM

## 2025-09-03 PROBLEM — M25.572 PAIN IN JOINT, ANKLE AND FOOT, LEFT: Status: RESOLVED | Noted: 2021-05-03 | Resolved: 2025-09-03

## 2025-09-03 PROBLEM — M79.644 THUMB PAIN, RIGHT: Status: RESOLVED | Noted: 2025-02-11 | Resolved: 2025-09-03

## 2025-09-03 LAB
CHOLEST SERPL-MCNC: 259 MG/DL
FASTING STATUS PATIENT QL REPORTED: NO
HDLC SERPL-MCNC: 59 MG/DL
LDLC SERPL CALC-MCNC: 183 MG/DL
NONHDLC SERPL-MCNC: 200 MG/DL
TRIGL SERPL-MCNC: 86 MG/DL

## 2025-09-03 PROCEDURE — 3074F SYST BP LT 130 MM HG: CPT | Performed by: FAMILY MEDICINE

## 2025-09-03 PROCEDURE — G0009 ADMIN PNEUMOCOCCAL VACCINE: HCPCS | Performed by: FAMILY MEDICINE

## 2025-09-03 PROCEDURE — G2211 COMPLEX E/M VISIT ADD ON: HCPCS | Performed by: FAMILY MEDICINE

## 2025-09-03 PROCEDURE — 36415 COLL VENOUS BLD VENIPUNCTURE: CPT | Performed by: FAMILY MEDICINE

## 2025-09-03 PROCEDURE — G0402 INITIAL PREVENTIVE EXAM: HCPCS | Performed by: FAMILY MEDICINE

## 2025-09-03 PROCEDURE — 90677 PCV20 VACCINE IM: CPT | Performed by: FAMILY MEDICINE

## 2025-09-03 PROCEDURE — 80061 LIPID PANEL: CPT | Performed by: FAMILY MEDICINE

## 2025-09-03 PROCEDURE — 99213 OFFICE O/P EST LOW 20 MIN: CPT | Mod: 25 | Performed by: FAMILY MEDICINE

## 2025-09-03 PROCEDURE — 3078F DIAST BP <80 MM HG: CPT | Performed by: FAMILY MEDICINE

## 2025-09-03 SDOH — HEALTH STABILITY: PHYSICAL HEALTH: ON AVERAGE, HOW MANY MINUTES DO YOU ENGAGE IN EXERCISE AT THIS LEVEL?: 40 MIN

## 2025-09-03 SDOH — HEALTH STABILITY: PHYSICAL HEALTH: ON AVERAGE, HOW MANY DAYS PER WEEK DO YOU ENGAGE IN MODERATE TO STRENUOUS EXERCISE (LIKE A BRISK WALK)?: 6 DAYS

## 2025-09-03 ASSESSMENT — ANXIETY QUESTIONNAIRES
8. IF YOU CHECKED OFF ANY PROBLEMS, HOW DIFFICULT HAVE THESE MADE IT FOR YOU TO DO YOUR WORK, TAKE CARE OF THINGS AT HOME, OR GET ALONG WITH OTHER PEOPLE?: NOT DIFFICULT AT ALL
GAD7 TOTAL SCORE: 0
GAD7 TOTAL SCORE: 0
1. FEELING NERVOUS, ANXIOUS, OR ON EDGE: NOT AT ALL
3. WORRYING TOO MUCH ABOUT DIFFERENT THINGS: NOT AT ALL
7. FEELING AFRAID AS IF SOMETHING AWFUL MIGHT HAPPEN: NOT AT ALL
4. TROUBLE RELAXING: NOT AT ALL
GAD7 TOTAL SCORE: 0
2. NOT BEING ABLE TO STOP OR CONTROL WORRYING: NOT AT ALL
5. BEING SO RESTLESS THAT IT IS HARD TO SIT STILL: NOT AT ALL
7. FEELING AFRAID AS IF SOMETHING AWFUL MIGHT HAPPEN: NOT AT ALL
IF YOU CHECKED OFF ANY PROBLEMS ON THIS QUESTIONNAIRE, HOW DIFFICULT HAVE THESE PROBLEMS MADE IT FOR YOU TO DO YOUR WORK, TAKE CARE OF THINGS AT HOME, OR GET ALONG WITH OTHER PEOPLE: NOT DIFFICULT AT ALL
6. BECOMING EASILY ANNOYED OR IRRITABLE: NOT AT ALL

## 2025-09-04 LAB
HPV HR 12 DNA CVX QL NAA+PROBE: NEGATIVE
HPV16 DNA CVX QL NAA+PROBE: NEGATIVE
HPV18 DNA CVX QL NAA+PROBE: NEGATIVE
HUMAN PAPILLOMA VIRUS FINAL DIAGNOSIS: NORMAL